# Patient Record
Sex: FEMALE | Race: WHITE | NOT HISPANIC OR LATINO | Employment: FULL TIME | URBAN - METROPOLITAN AREA
[De-identification: names, ages, dates, MRNs, and addresses within clinical notes are randomized per-mention and may not be internally consistent; named-entity substitution may affect disease eponyms.]

---

## 2017-02-13 ENCOUNTER — OFFICE VISIT (OUTPATIENT)
Dept: MEDICAL GROUP | Facility: MEDICAL CENTER | Age: 42
End: 2017-02-13
Payer: COMMERCIAL

## 2017-02-13 VITALS
DIASTOLIC BLOOD PRESSURE: 80 MMHG | TEMPERATURE: 89.4 F | BODY MASS INDEX: 27.3 KG/M2 | OXYGEN SATURATION: 98 % | HEART RATE: 66 BPM | RESPIRATION RATE: 14 BRPM | SYSTOLIC BLOOD PRESSURE: 122 MMHG | WEIGHT: 190.7 LBS | HEIGHT: 70 IN

## 2017-02-13 DIAGNOSIS — J06.9 UPPER RESPIRATORY TRACT INFECTION, UNSPECIFIED TYPE: ICD-10-CM

## 2017-02-13 DIAGNOSIS — G93.31 POSTVIRAL SYNDROME: ICD-10-CM

## 2017-02-13 PROCEDURE — 99214 OFFICE O/P EST MOD 30 MIN: CPT | Performed by: PHYSICIAN ASSISTANT

## 2017-02-13 RX ORDER — ALBUTEROL SULFATE 90 UG/1
2 AEROSOL, METERED RESPIRATORY (INHALATION) EVERY 6 HOURS PRN
Qty: 8.5 G | Refills: 0 | Status: SHIPPED | OUTPATIENT
Start: 2017-02-13 | End: 2017-08-10 | Stop reason: SDUPTHER

## 2017-02-13 ASSESSMENT — PATIENT HEALTH QUESTIONNAIRE - PHQ9: CLINICAL INTERPRETATION OF PHQ2 SCORE: 0

## 2017-02-13 ASSESSMENT — PAIN SCALES - GENERAL: PAINLEVEL: NO PAIN

## 2017-02-13 NOTE — MR AVS SNAPSHOT
"        Janette CUEVA Beny   2017 2:40 PM   Office Visit   MRN: [id number]    Department:  [place] [address]   Dept Phone:  [phone]    Description:  Female : 1975   Provider:  Hannah Black PA-C           Reason for Visit     URI poss URI      Allergies as of 2017     Allergen Noted Reactions    Codeine 2012   Vomiting      You were diagnosed with     Upper respiratory tract infection, unspecified type   [4849420]       Postviral syndrome   [527049]         Vital Signs     Blood Pressure Pulse Temperature Respirations Height Weight    122/80 mmHg 66 31.9 °C (89.4 °F) 14 1.778 m (5' 10\") 86.5 kg (190 lb 11.2 oz)    Body Mass Index Oxygen Saturation Last Menstrual Period Breastfeeding? Smoking Status       27.36 kg/m2 98% 2017 No Never Smoker        Basic Information     Date Of Birth Sex Race Ethnicity Preferred Language    1975 Female White Non- English      Your appointments     2017  8:00 AM   ANNUAL EXAM PREVENTATIVE with Hannah Black PA-C   [facility]    [address]  [address]  [place] [address]   [phone]              Problem List              ICD-10-CM Priority Class Noted - Resolved    Irritable bowel syndrome (IBS) K58.9   2014 - Present    Seasonal allergies J30.2   2014 - Present    Preventative health care Z00.00   2015 - Present    Vitamin D deficiency disease E55.9   2015 - Present    Elevated LDL cholesterol level E78.00   2015 - Present    Blood pressure elevated I10   2015 - Present      Health Maintenance        Date Due Completion Dates    PAP SMEAR 1994 ---    MAMMOGRAM 2015    IMM INFLUENZA (1) 2016 10/1/2010    IMM DTaP/Tdap/Td Vaccine (2 - Td) 2024            Current Immunizations     Influenza TIV (IM) 10/1/2010    Tdap Vaccine 2014    Tetanus Vaccine 2001      Below and/or attached are the medications " your provider expects you to take. Review all of your home medications and newly ordered medications with your provider and/or pharmacist. Follow medication instructions as directed by your provider and/or pharmacist. Please keep your medication list with you and share with your provider. Update the information when medications are discontinued, doses are changed, or new medications (including over-the-counter products) are added; and carry medication information at all times in the event of emergency situations     Allergies:  CODEINE - Vomiting               Medications  Valid as of: February 13, 2017 -  3:06 PM    Generic Name Brand Name Tablet Size Instructions for use    Albuterol Sulfate (Aero Soln) albuterol 108 (90 BASE) MCG/ACT Inhale 2 Puffs by mouth every 6 hours as needed for Shortness of Breath.        Cholecalciferol (Tab) cholecalciferol 1000 UNIT Take 1,000 Units by mouth every day.        Fluticasone Propionate (Suspension) FLONASE 50 MCG/ACT Spray 1 Spray in nose every day.        Multiple Vitamins-Minerals   Take  by mouth every day.        Omeprazole (CAPSULE DELAYED RELEASE) PRILOSEC 20 MG Take 1 Cap by mouth every day.        Sucralfate (Tab) CARAFATE 1 GM Take 1 Tab by mouth 4 Times a Day,Before Meals and at Bedtime.        .                 Medicines prescribed today were sent to:     [facility] [place] [address]    [address] [facility] [address]    Phone: [phone] Fax: [phone]    Open 24 Hours?: No      Medication refill instructions:       If your prescription bottle indicates you have medication refills left, it is not necessary to call your provider’s office. Please contact your pharmacy and they will refill your medication.    If your prescription bottle indicates you do not have any refills left, you may request refills at any time through one of the following ways: The online [employer]od[employer] system (except Urgent Care), by calling your provider’s  office, or by asking your pharmacy to contact your provider’s office with a refill request. Medication refills are processed only during regular business hours and may not be available until the next business day. Your provider may request additional information or to have a follow-up visit with you prior to refilling your medication.   *Please Note: Medication refills are assigned a new Rx number when refilled electronically. Your pharmacy may indicate that no refills were authorized even though a new prescription for the same medication is available at the pharmacy. Please request the medicine by name with the pharmacy before contacting your provider for a refill.           [email or web address] Access Code: Activation code not generated  Current Junk4Junk Status: Active

## 2017-02-13 NOTE — Clinical Note
[facility]  Hannah Black PA-C  [address]  [place] [address]  Fax: [phone] Authorization for Release/Disclosure of Protected Health Information   Name: JANETTE SWAN : 1975 SSN: [id number]   Address: [address] [place] [address] [place] [address] Phone:    [phone] (home)    I authorize the entity listed below to release/disclose the PHI below to [facility]/Hannah Black PA-C   Provider or Entity Name: Hugh Celis M.D. [facility]      Address   City, [facility]   Phone:      Fax:     Reason for request: continuity of care   Information to be released:    [  ] LAST COLONOSCOPY, including any PATH REPORT [  ] LAST DEXA  [  ] LAST MAMMOGRAM  [xxx  ] LAST PAP [  ] RETINA EXAM REPORT  [  ] IMMUNIZATION RECORDS  [  ] Release all info      [  ] Check here and initial the line next to each item to release ALL health information INCLUDING  _____ Care and treatment for drug and / or alcohol abuse  _____ HIV testing, infection status, or AIDS  _____ Genetic Testing    DATES OF SERVICE OR TIME PERIOD TO BE DISCLOSED: _____________  I understand and acknowledge that:  * This Authorization may be revoked at any time by you in writing, except if your health information has already been used or disclosed.  * Your health information that will be used or disclosed as a result of you signing this authorization could be re-disclosed by the recipient. If this occurs, your re-disclosed health information may no longer be protected by State or Federal laws.  * You may refuse to sign this Authorization. Your refusal will not affect your ability to obtain treatment.  * This Authorization becomes effective upon signing and will  on (date) __________. If no date is indicated, this Authorization will  one (1) year from the signature date.    Name: Janette Swan    Signature:     Date: 2017

## 2017-02-14 NOTE — PROGRESS NOTES
"Chief Complaint   Patient presents with   • URI     poss URI       HPI:   Had multiple URI sx 4 weeks ago and lasted about 2 weeks.   Sx were nasal congestion, ear popping, feeling of postnasal drip, sore throat, cough, fatigue.   Most of those symptoms resolved after 2 weeks, now just has cough, mild nasal congestion, feeling of postnasal drip and shortness of breath when skiing.   Denies fever, chills, headache, ear pain or pressure, facial pain or pressure, wheezing, chest pain, abdominal discomfort, nausea, vomiting, diarrhea, muscle ache, rash  Similarly ill exposures: yes  several coworkers  Medical history positive for bronchitis and pneumonia in past.   Has used albuterol in the past which has helped with this type of feeling after her colds.  Denies hx of asthma, copd  She  reports that she has never smoked. She has never used smokeless tobacco.    ROS  No fever. + cough. No skin rashes.  No N/V/D    Blood pressure 122/80, pulse 66, temperature 31.9 °C (89.4 °F), resp. rate 14, height 1.778 m (5' 10\"), weight 86.5 kg (190 lb 11.2 oz), last menstrual period 01/01/2017, SpO2 98 %, not currently breastfeeding.  Gen: alert, No conversational dyspnea. No audible wheeze, nontoxic.  PERRL, conjunctiva non-injected. No photophobia. No eye discharge.  Ears: normal pinnae. TM: normal  Nares patent with thick mucus. Nasal turbinates edematous  Sinuses non tender over maxillary / non tender frontal sinuses  Throat: erythematous injection. No exudate. Moderate thick white post nasal mucus  Neck: supple, with  no adenopathy in the neck or supraclavicular regions  Lungs:  clear to auscultation, no wheezing, no retraction, no stridor, good air exchange. No forced expiratory wheezing  Abdomen soft. No HSM.   Skin: warm and dry. No rash.    A/P    1. Upper respiratory tract infection, unspecified type  Most likely resolving and now just has the post viral tussis.   May benefit from albuterol 2 puffs prior to skiing.   Also " recommend treating nasal congestion and PND.   Nasal saline spray daily, Flonase daily, Mucinex with lots of water and some rest  Gargling with salt water, drinking warm teas. No chest x-ray needed at this time since lungs were clear to auscultation on exam. Told patient to follow-up in 2 weeks if not better  - albuterol 108 (90 BASE) MCG/ACT Aero Soln inhalation aerosol; Inhale 2 Puffs by mouth every 6 hours as needed for Shortness of Breath.  Dispense: 8.5 g; Refill: 0    2. Postviral syndrome  See #1  - albuterol 108 (90 BASE) MCG/ACT Aero Soln inhalation aerosol; Inhale 2 Puffs by mouth every 6 hours as needed for Shortness of Breath.  Dispense: 8.5 g; Refill: 0      Treatments advised today in addition to orders above  include: Nasal decongestant, sinus rinse or nasal saline, OTC cough/cold product of patient's choice PRN, OTC nonsteroidals PRN, prescription for symptomatic treatment as written, vaporizer, fluids and rest and heat application to sinuses    Please note that this dictation was created using voice recognition software. I have made every reasonable attempt to correct obvious errors, but I expect that there are errors of grammar and possibly content that I did not discover before finalizing the note.

## 2017-05-09 ENCOUNTER — HOSPITAL ENCOUNTER (OUTPATIENT)
Dept: RADIOLOGY | Facility: MEDICAL CENTER | Age: 42
End: 2017-05-09
Attending: OBSTETRICS & GYNECOLOGY
Payer: COMMERCIAL

## 2017-05-09 DIAGNOSIS — Z13.9 SCREENING: ICD-10-CM

## 2017-05-09 PROCEDURE — G0202 SCR MAMMO BI INCL CAD: HCPCS

## 2017-05-16 ENCOUNTER — HOSPITAL ENCOUNTER (OUTPATIENT)
Dept: RADIOLOGY | Facility: MEDICAL CENTER | Age: 42
End: 2017-05-16
Attending: OBSTETRICS & GYNECOLOGY
Payer: COMMERCIAL

## 2017-05-16 DIAGNOSIS — R92.8 ABNORMAL MAMMOGRAM: ICD-10-CM

## 2017-05-16 PROCEDURE — G0206 DX MAMMO INCL CAD UNI: HCPCS | Mod: RT

## 2017-05-16 PROCEDURE — 76642 ULTRASOUND BREAST LIMITED: CPT | Mod: RT

## 2017-06-05 ENCOUNTER — OFFICE VISIT (OUTPATIENT)
Dept: MEDICAL GROUP | Facility: MEDICAL CENTER | Age: 42
End: 2017-06-05
Payer: COMMERCIAL

## 2017-06-05 VITALS
SYSTOLIC BLOOD PRESSURE: 116 MMHG | WEIGHT: 187.61 LBS | HEIGHT: 70 IN | OXYGEN SATURATION: 96 % | TEMPERATURE: 98.4 F | RESPIRATION RATE: 14 BRPM | HEART RATE: 77 BPM | DIASTOLIC BLOOD PRESSURE: 64 MMHG | BODY MASS INDEX: 26.86 KG/M2

## 2017-06-05 DIAGNOSIS — R06.83 SNORING: ICD-10-CM

## 2017-06-05 DIAGNOSIS — E55.9 VITAMIN D DEFICIENCY DISEASE: ICD-10-CM

## 2017-06-05 DIAGNOSIS — E78.00 ELEVATED LDL CHOLESTEROL LEVEL: ICD-10-CM

## 2017-06-05 DIAGNOSIS — Z00.00 PREVENTATIVE HEALTH CARE: Primary | ICD-10-CM

## 2017-06-05 DIAGNOSIS — K58.9 IRRITABLE BOWEL SYNDROME, UNSPECIFIED TYPE: ICD-10-CM

## 2017-06-05 DIAGNOSIS — Z13.1 SCREENING FOR DIABETES MELLITUS: ICD-10-CM

## 2017-06-05 PROCEDURE — 99396 PREV VISIT EST AGE 40-64: CPT | Performed by: PHYSICIAN ASSISTANT

## 2017-06-05 ASSESSMENT — PAIN SCALES - GENERAL: PAINLEVEL: NO PAIN

## 2017-06-05 NOTE — Clinical Note
[facility]  Hannah Black PA-C  [address]  [place] [address]  Fax: [phone]   Authorization for Release/Disclosure of   Protected Health Information   Name: JANETTE SWAN : 1975 SSN: [id number]   Address: [address] [place] [address] [place] [address] Phone:    [phone] (home)    I authorize the entity listed below to release/disclose the PHI below to:   [facility]/Hannah Black PA-C and Hannah Black PA-C   Provider or Entity Name:  Dr. Celis   Address   City, [facility]   Phone:      Fax:     Reason for request: continuity of care   Information to be released:    [  ] LAST COLONOSCOPY,  including any PATH REPORT and follow-up  [  ] LAST FIT/COLOGUARD RESULT [  ] LAST DEXA  [  ] LAST MAMMOGRAM  [ xxxx ] LAST PAP  [  ] LAST LABS [  ] RETINA EXAM REPORT  [  ] IMMUNIZATION RECORDS  [  ] Release all info      [  ] Check here and initial the line next to each item to release ALL health information INCLUDING  _____ Care and treatment for drug and / or alcohol abuse  _____ HIV testing, infection status, or AIDS  _____ Genetic Testing    DATES OF SERVICE OR TIME PERIOD TO BE DISCLOSED: _____________  I understand and acknowledge that:  * This Authorization may be revoked at any time by you in writing, except if your health information has already been used or disclosed.  * Your health information that will be used or disclosed as a result of you signing this authorization could be re-disclosed by the recipient. If this occurs, your re-disclosed health information may no longer be protected by State or Federal laws.  * You may refuse to sign this Authorization. Your refusal will not affect your ability to obtain treatment.  * This Authorization becomes effective upon signing and will  on (date) __________.      If no date is indicated, this Authorization will  one (1) year from the signature date.    Name: Janette Swan    Signature:   Date:     2017       PLEASE FAX  REQUESTED RECORDS BACK TO: [phone]

## 2017-06-05 NOTE — ASSESSMENT & PLAN NOTE
Lipids- 5/9/14 tc 193/ tg 88/ hdl 54/ ldl 121  She is very active especially outdoors, does a lot of hiking, motorcross, skiing during the yadav (one day did 30 mi)  Has been eating very healthy, breakfast lunch and dinner with snacks in between, loves vegetables and has been eating a lot of fish.   Denies chest pain, shortness of breath, lightheadedness, muscle cramping

## 2017-06-05 NOTE — ASSESSMENT & PLAN NOTE
Both parents had sleep apnea.   She snores, occasionally wake with headaches.   Sometimes fatigue but not usually.   Denies startling awake.   Has hx of tonsilectomy

## 2017-06-05 NOTE — PROGRESS NOTES
"Subjective:     Chief Complaint   Patient presents with   • Annual Exam     Janette Swan is a 41 y.o. female here today for annual visit as listed below    Mammo- 5/16/17  PAP- 9/2016, with Dr. Celis  TDap- 4/25/14  Lipids- 5/9/14 tc 193/ tg 88/ hdl 54/ ldl 121    Irritable bowel syndrome (IBS)  No problems since 2005, controlled with imodium  Colonoscopy 2005, normal per pt    Snoring  Both parents had sleep apnea.   She snores, occasionally wake with headaches.   Sometimes fatigue but not usually.   Denies startling awake.   Has hx of tonsilectomy     Elevated LDL cholesterol level  Lipids- 5/9/14 tc 193/ tg 88/ hdl 54/ ldl 121  She is very active especially outdoors, does a lot of hiking, motorcross, skiing during the yadav (one day did 30 mi)  Has been eating very healthy, breakfast lunch and dinner with snacks in between, loves vegetables and has been eating a lot of fish.   Denies chest pain, shortness of breath, lightheadedness, muscle cramping       Current medicines (including changes today)  Current Outpatient Prescriptions   Medication Sig Dispense Refill   • albuterol 108 (90 BASE) MCG/ACT Aero Soln inhalation aerosol Inhale 2 Puffs by mouth every 6 hours as needed for Shortness of Breath. 8.5 g 0     No current facility-administered medications for this visit.     She  has a past medical history of IBS (irritable bowel syndrome); History of pneumonia (12/11); History of intestinal parasite (1994); Advance directive in chart (08/15/02); and Nephrolithiasis (4/24/2014).    ROS   No chest pain, no shortness of breath, no abdominal pain       Objective:     Blood pressure 116/64, pulse 77, temperature 36.9 °C (98.4 °F), resp. rate 14, height 1.778 m (5' 10\"), weight 85.1 kg (187 lb 9.8 oz), last menstrual period 05/14/2017, SpO2 96 %, not currently breastfeeding. Body mass index is 26.92 kg/(m^2).   Physical Exam:  Alert, oriented in no acute distress.  Eye contact is good, speech goal directed, " affect calm  HEENT: conjunctiva non-injected, sclera non-icteric, PERRL.  Pinna normal. TM pearly gray.   Oral mucous membranes pink and moist with no lesions.  Neck No adenopathy or masses in the neck or supraclavicular regions.  Lungs: clear to auscultation bilaterally with good excursion.  CV: regular rate and rhythm.  Abdomen: soft, nontender, no HSM, No CVAT  Ext: no edema, color normal, peripheral pulses 2+, temperature normal    Assessment and Plan:   The following treatment plan was discussed     1. Preventative health care  Mammo- 5/16/17  PAP- 9/2016, with Dr. Celis requesting record.   TDap- 4/25/14  Lipids- 5/9/14 tc 193/ tg 88/ hdl 54/ ldl 121 ordered labs today.   - COMP METABOLIC PANEL; Future  - LIPID PROFILE; Future  - VITAMIN D,25 HYDROXY; Future    2. Elevated LDL cholesterol level  Labs ordered, will call for results  Continue with the healthy lifestyle changes.   - COMP METABOLIC PANEL; Future  - LIPID PROFILE; Future    3. Vitamin D deficiency disease  Labs ordered, will call for results  cotninue with taking daily vit D3.   - VITAMIN D,25 HYDROXY; Future    4. Irritable bowel syndrome, unspecified type  controlled since have been controlling anxiety and not currently working     5. Screening for diabetes mellitus  Labs ordered, will call for results  - COMP METABOLIC PANEL; Future    6. Snoring  Has been snoring for a while and since both parents have sleep apnea she would just like to rule this out.   O2 stat is normal on RA. Does get occasional morning headaches.   Ordered overnight pulse ox. Going through [facility]    Followup: Return in about 1 year (around 6/5/2018) for annual.           Please note that this dictation was created using voice recognition software. I have made every reasonable attempt to correct obvious errors, but I expect that there are errors of grammar and possibly content that I did not discover before finalizing the note.

## 2017-06-14 ENCOUNTER — HOSPITAL ENCOUNTER (OUTPATIENT)
Dept: LAB | Facility: MEDICAL CENTER | Age: 42
End: 2017-06-14
Attending: PHYSICIAN ASSISTANT
Payer: COMMERCIAL

## 2017-06-14 DIAGNOSIS — E55.9 VITAMIN D DEFICIENCY DISEASE: ICD-10-CM

## 2017-06-14 DIAGNOSIS — E78.00 ELEVATED LDL CHOLESTEROL LEVEL: ICD-10-CM

## 2017-06-14 DIAGNOSIS — Z00.00 PREVENTATIVE HEALTH CARE: ICD-10-CM

## 2017-06-14 DIAGNOSIS — Z13.1 SCREENING FOR DIABETES MELLITUS: ICD-10-CM

## 2017-06-14 LAB
25(OH)D3 SERPL-MCNC: 27 NG/ML (ref 30–100)
ALBUMIN SERPL BCP-MCNC: 4.3 G/DL (ref 3.2–4.9)
ALBUMIN/GLOB SERPL: 1.4 G/DL
ALP SERPL-CCNC: 44 U/L (ref 30–99)
ALT SERPL-CCNC: 12 U/L (ref 2–50)
ANION GAP SERPL CALC-SCNC: 7 MMOL/L (ref 0–11.9)
AST SERPL-CCNC: 14 U/L (ref 12–45)
BILIRUB SERPL-MCNC: 0.2 MG/DL (ref 0.1–1.5)
BUN SERPL-MCNC: 7 MG/DL (ref 8–22)
CALCIUM SERPL-MCNC: 9.7 MG/DL (ref 8.5–10.5)
CHLORIDE SERPL-SCNC: 106 MMOL/L (ref 96–112)
CHOLEST SERPL-MCNC: 195 MG/DL (ref 100–199)
CO2 SERPL-SCNC: 26 MMOL/L (ref 20–33)
CREAT SERPL-MCNC: 0.75 MG/DL (ref 0.5–1.4)
GFR SERPL CREATININE-BSD FRML MDRD: >60 ML/MIN/1.73 M 2
GLOBULIN SER CALC-MCNC: 3 G/DL (ref 1.9–3.5)
GLUCOSE SERPL-MCNC: 91 MG/DL (ref 65–99)
HDLC SERPL-MCNC: 48 MG/DL
LDLC SERPL CALC-MCNC: 119 MG/DL
POTASSIUM SERPL-SCNC: 3.9 MMOL/L (ref 3.6–5.5)
PROT SERPL-MCNC: 7.3 G/DL (ref 6–8.2)
SODIUM SERPL-SCNC: 139 MMOL/L (ref 135–145)
TRIGL SERPL-MCNC: 138 MG/DL (ref 0–149)

## 2017-06-14 PROCEDURE — 80053 COMPREHEN METABOLIC PANEL: CPT

## 2017-06-14 PROCEDURE — 80061 LIPID PANEL: CPT

## 2017-06-14 PROCEDURE — 82306 VITAMIN D 25 HYDROXY: CPT

## 2017-06-14 PROCEDURE — 36415 COLL VENOUS BLD VENIPUNCTURE: CPT

## 2017-06-15 ENCOUNTER — TELEPHONE (OUTPATIENT)
Dept: MEDICAL GROUP | Facility: MEDICAL CENTER | Age: 42
End: 2017-06-15

## 2017-06-15 NOTE — TELEPHONE ENCOUNTER
----- Message from Hannah Black PA-C sent at 6/15/2017 10:04 AM PDT -----  Please inform patient that her vitamin D is still slightly low as well as increased slightly from 3 years ago. However recommend increasing her vitamin D3 but 1,000iu daily.   Also her cholesterol stayed about the same. Continue with healthy lifestyle, exercising and working shelby healthy diet. We will continue to monitor although not medications needed at this time.   Thank you  Hannah

## 2017-06-15 NOTE — TELEPHONE ENCOUNTER
Phone Number Called: [phone]     Message: Left message for patient about lab results.     Left Message for patient to call back: N\A

## 2017-06-20 NOTE — TELEPHONE ENCOUNTER
Phone Number Called: [phone] (home)     Message: Patient informed of result.     Left Message for patient to call back: yes

## 2017-07-12 ENCOUNTER — PATIENT MESSAGE (OUTPATIENT)
Dept: MEDICAL GROUP | Facility: MEDICAL CENTER | Age: 42
End: 2017-07-12

## 2017-07-12 DIAGNOSIS — R06.83 SNORING: ICD-10-CM

## 2017-07-12 DIAGNOSIS — R94.2 ABNORMAL RESULTS OF PULMONARY FUNCTION STUDIES: ICD-10-CM

## 2017-07-12 NOTE — TELEPHONE ENCOUNTER
I'm not sure if someone can look into this for me?   I only see in media the order for overnight pulse ox but no results.   Can someone please track down the results and contact pt that we got them?   Thank you  Hannah Black PA-C at 6/26/2017 12:43 PM      Status: Signed         Expand All Collapse All    Pt had overnight pulse ox done from [facility].    I do not see results yet.    Can you please look into this for me?   Thank you  Hannah

## 2017-07-12 NOTE — TELEPHONE ENCOUNTER
From: Janette Swan  To: Hannah Blakc PA-C  Sent: 7/12/2017 8:55 AM PDT  Subject: Test Result Question    Hi Hannah Cantrell,    I still haven't received my pulse ox test results. Please look into it for me.    Thanks,  Janette

## 2017-08-01 ENCOUNTER — HOSPITAL ENCOUNTER (OUTPATIENT)
Dept: CARDIOLOGY | Facility: MEDICAL CENTER | Age: 42
End: 2017-08-01
Attending: PHYSICIAN ASSISTANT
Payer: COMMERCIAL

## 2017-08-01 DIAGNOSIS — R94.2 ABNORMAL RESULTS OF PULMONARY FUNCTION STUDIES: ICD-10-CM

## 2017-08-01 LAB
LV EJECT FRACT  99904: 60
LV EJECT FRACT MOD 2C 99903: 63.88
LV EJECT FRACT MOD 4C 99902: 62.94
LV EJECT FRACT MOD BP 99901: 61.99

## 2017-08-01 PROCEDURE — 93306 TTE W/DOPPLER COMPLETE: CPT

## 2017-08-03 ENCOUNTER — TELEPHONE (OUTPATIENT)
Dept: MEDICAL GROUP | Facility: MEDICAL CENTER | Age: 42
End: 2017-08-03

## 2017-08-03 NOTE — TELEPHONE ENCOUNTER
Left message for patient to call back at [phone].  Please inform pt that she does not qualify for o2 set up because her RA sat is 96%

## 2017-08-03 NOTE — TELEPHONE ENCOUNTER
Spoke with pt and notified her of message. She states OPO results qualify her for o2 at night. Per Emily, we will contact [facility] to see if she can qualify with OPO results.     Call back number: [phone], ok to leave a detailed message

## 2017-08-08 ENCOUNTER — TELEPHONE (OUTPATIENT)
Dept: MEDICAL GROUP | Facility: MEDICAL CENTER | Age: 42
End: 2017-08-08

## 2017-08-08 NOTE — TELEPHONE ENCOUNTER
i informed the pt that they did not receive the first order we sent over. And that i have resubmitted this. i will reconnect with [facility] to see why this is taking so long

## 2017-08-08 NOTE — TELEPHONE ENCOUNTER
----- Message from CARLYN Forman sent at 8/8/2017  6:57 AM PDT -----  Regarding: FW: RE: RE: Prescription Question  Contact: [phone]  What is going on with this??   ----- Message -----     From: Janette Swan     Sent: 8/7/2017  11:43 PM       To: CARLYN Forman  Subject: RE: RE: RE: Prescription Question                Emily needs help, she is confused and I have not received progress on this order.  Please review, thanks.     ----- Message -----  From: CARLYN Forman  Sent: 8/1/17 8:40 PM  To: Janette Swan  Subject: RE: RE: Prescription Question    I signed the order and it should have been sent over on 7/24/17.  I will ask Emily to check on it in am.  Very sorry for the delay.  Patricia    ----- Message -----     From: JANETTE SWAN     Sent: 7/31/2017  9:28 AM PDT       To: CARLYN Forman  Subject: RE: RE: Prescription Question    Nico Gomez,    Nobody has contacted me about this yet.  How long does it usually take?    Janette Cifuentes    ----- Message -----  From: CARLYN Forman  Sent: 7/24/17 2:57 PM  To: Janette Swan  Subject: RE: Prescription Question    Will get you set up.  I will do the order today.    Patricia    ----- Message -----     From: JANETTE SWAN     Sent: 7/24/2017  1:58 PM PDT       To: CARLYN Forman  Subject: Prescription Question    Nico Gomez,    Based on my pulse ox test I would like to go on oxygen until my sleep study in December.  Please prescribe it for me.      Janette Cifuentes

## 2017-08-08 NOTE — TELEPHONE ENCOUNTER
Pt informed-also sent pt mychart message stating it most likely wont be covered since she did not test abn

## 2017-08-08 NOTE — TELEPHONE ENCOUNTER
Called [facility]. We can have pt do apnea test to see how those results are. Or we can order o2 for pt and she can pay out of pocket-[employer]people message sent to pt- multiple notes on this

## 2017-08-10 ENCOUNTER — OFFICE VISIT (OUTPATIENT)
Dept: MEDICAL GROUP | Facility: MEDICAL CENTER | Age: 42
End: 2017-08-10
Payer: COMMERCIAL

## 2017-08-10 VITALS
SYSTOLIC BLOOD PRESSURE: 128 MMHG | TEMPERATURE: 98.1 F | OXYGEN SATURATION: 96 % | DIASTOLIC BLOOD PRESSURE: 78 MMHG | HEART RATE: 70 BPM | WEIGHT: 190 LBS | HEIGHT: 70 IN | BODY MASS INDEX: 27.2 KG/M2

## 2017-08-10 DIAGNOSIS — E78.00 ELEVATED LDL CHOLESTEROL LEVEL: ICD-10-CM

## 2017-08-10 DIAGNOSIS — R09.02 HYPOXIA: ICD-10-CM

## 2017-08-10 DIAGNOSIS — G47.30 SLEEP APNEA IN ADULT: ICD-10-CM

## 2017-08-10 DIAGNOSIS — E55.9 VITAMIN D DEFICIENCY: ICD-10-CM

## 2017-08-10 PROCEDURE — 99214 OFFICE O/P EST MOD 30 MIN: CPT | Performed by: NURSE PRACTITIONER

## 2017-08-10 RX ORDER — ALBUTEROL SULFATE 90 UG/1
2 AEROSOL, METERED RESPIRATORY (INHALATION) EVERY 6 HOURS PRN
Qty: 8.5 G | Refills: 1 | Status: SHIPPED | OUTPATIENT
Start: 2017-08-10 | End: 2018-12-13

## 2017-08-10 NOTE — MR AVS SNAPSHOT
"        Janetteazalia Swan   8/10/2017 8:15 AM   Office Visit   MRN: [id number]    Department:  [facility]   Dept Phone:  [phone]    Description:  Female : 1975   Provider:  CARLYN Forman           Reason for Visit     Follow-Up discuss ECHO results       Allergies as of 8/10/2017     Allergen Noted Reactions    Codeine 2012   Vomiting      You were diagnosed with     Hypoxia   [869409]       Sleep apnea in adult   [8082584]   f/u with sleep clinic as sched in oct.  o2 2 l/min at nite.  do PFT's.  f/u with pt with results. refill inhaler    Elevated LDL cholesterol level   [401833]   improve low fat/chol/complex carb diet.  already exercises regularly    Vitamin D deficiency   [4707314]   inc d3 to 2000 units daily      Vital Signs     Blood Pressure Pulse Temperature Height Weight Body Mass Index    128/78 mmHg 70 36.7 °C (98.1 °F) 1.778 m (5' 10\") 86.183 kg (190 lb) 27.26 kg/m2    Oxygen Saturation Smoking Status                96% Never Smoker           Basic Information     Date Of Birth Sex Race Ethnicity Preferred Language    1975 Female White Non- English      Your appointments     Oct 10, 2017  2:40 PM   New Patient with C Rotation   [facility] (--)    [address] [place] [address]  Sai CALZADA [id number] [phone]           Please bring enclosed paperwork completed along with your insurance card and photo ID.              Problem List              ICD-10-CM Priority Class Noted - Resolved    Irritable bowel syndrome (IBS) K58.9   2014 - Present    Preventative health care Z00.00   2015 - Present    Vitamin D deficiency disease E55.9   2015 - Present    Elevated LDL cholesterol level E78.00   2015 - Present    Snoring R06.83   2017 - Present      Health Maintenance        Date Due Completion Dates    PAP SMEAR 1994 ---    IMM INFLUENZA (1) 2017 10/1/2010    MAMMOGRAM 2018, 2017, " 4/8/2014    IMM DTaP/Tdap/Td Vaccine (2 - Td) 4/25/2024 4/25/2014            Current Immunizations     Influenza TIV (IM) 10/1/2010    Tdap Vaccine 4/25/2014    Tetanus Vaccine 1/1/2001      Below and/or attached are the medications your provider expects you to take. Review all of your home medications and newly ordered medications with your provider and/or pharmacist. Follow medication instructions as directed by your provider and/or pharmacist. Please keep your medication list with you and share with your provider. Update the information when medications are discontinued, doses are changed, or new medications (including over-the-counter products) are added; and carry medication information at all times in the event of emergency situations     Allergies:  CODEINE - Vomiting               Medications  Valid as of: August 10, 2017 -  9:20 AM    Generic Name Brand Name Tablet Size Instructions for use    Albuterol Sulfate (Aero Soln) albuterol 108 (90 BASE) MCG/ACT Inhale 2 Puffs by mouth every 6 hours as needed for Shortness of Breath.        .                 Medicines prescribed today were sent to:     [place] [id number] [facility] [place] [address]    Phone: [phone] Fax: [phone]    Open 24 Hours?: No      Medication refill instructions:       If your prescription bottle indicates you have medication refills left, it is not necessary to call your provider’s office. Please contact your pharmacy and they will refill your medication.    If your prescription bottle indicates you do not have any refills left, you may request refills at any time through one of the following ways: The online [employer] system (except Urgent Care), by calling your provider’s office, or by asking your pharmacy to contact your provider’s office with a refill request. Medication refills are processed only during regular business hours and may not be available until the next business  day. Your provider may request additional information or to have a follow-up visit with you prior to refilling your medication.   *Please Note: Medication refills are assigned a new Rx number when refilled electronically. Your pharmacy may indicate that no refills were authorized even though a new prescription for the same medication is available at the pharmacy. Please request the medicine by name with the pharmacy before contacting your provider for a refill.           [employer] Access Code: Activation code not generated  Current [employer] Status: Active

## 2017-08-10 NOTE — PROGRESS NOTES
Subjective:      Janette Swan is a 42 y.o. female who presents with Follow-Up            HPI  Seen in f/u for snoring.  She needs refill on alb.  Uses only rarely with skiing.    Reviewed lab with pt. Her CMP, GFR is wnl  LP shows trg and LDL are abn and not at goal.  Tries to follow a healthy diet.  Exercise regulalry.  + FH of hyperlipidemia, DM and CAD.  HDL is at goal  Vitamin d is low at 27.  She is on otc supplement 1000 units daily.   Echo shows no signifcant abn.  Trace regurg on multiple valves only.    She has a heavy FH of sleep apnea.  She had a apnea link done that showed o2 sat <89%  For 11 min.  She does qualify for o2.  She has appt for sleep study in oct.  We had previously asked for o2 at pt request but o2 company denied.  We were unaware of the abn apnea link at the time of the order.    Reviewed all lab, echo and apnea link with pt.  She does qualify for o2.      Patient Active Problem List    Diagnosis Date Noted   • Snoring 06/05/2017   • Preventative health care 08/27/2015   • Vitamin D deficiency disease 08/27/2015   • Elevated LDL cholesterol level 08/27/2015   • Irritable bowel syndrome (IBS) 04/25/2014     Current Outpatient Prescriptions   Medication Sig Dispense Refill   • albuterol 108 (90 BASE) MCG/ACT Aero Soln inhalation aerosol Inhale 2 Puffs by mouth every 6 hours as needed for Shortness of Breath. 8.5 g 0     No current facility-administered medications for this visit.     Allergies   Allergen Reactions   • Codeine Vomiting       ROS    Review of Systems   Constitutional: Negative.  Negative for fever, chills, weight loss, malaise/fatigue and diaphoresis.   HENT: Negative.  Negative for hearing loss, ear pain, nosebleeds, congestion, sore throat, neck pain, tinnitus and ear discharge.    Respiratory: Negative.  Negative for cough, hemoptysis, sputum production, shortness of breath, wheezing and stridor.    Cardiovascular: Negative.  Negative for chest pain, palpitations,  "orthopnea, claudication, leg swelling and PND.   Gastrointestinal: denies nausea, vomiting, diarrhea, constipation, heartburn, melena or hematochezia.  Genitourinary: Denies dysuria, hematuria, urinary incontinence, frequency or urgency.         Objective:     /78 mmHg  Pulse 70  Temp(Src) 36.7 °C (98.1 °F)  Ht 1.778 m (5' 10\")  Wt 86.183 kg (190 lb)  BMI 27.26 kg/m2  SpO2 96%     Physical Exam      Physical Exam   Vitals reviewed.  Constitutional: oriented to person, place, and time. appears well-developed and well-nourished. No distress.   Cardiovascular: Normal rate, regular rhythm, normal heart sounds and intact distal pulses.  Exam reveals no gallop and no friction rub.  No murmur heard.  No carotid bruits.   Pulmonary/Chest: Effort normal and breath sounds normal. No stridor. No respiratory distress. no wheezes or rales. exhibits no tenderness.   Musculoskeletal: Normal range of motion. exhibits no edema. edmar pedal pulses 2+.  Neurological: alert and oriented to person, place, and time. exhibits normal muscle tone. Coordination normal.   Skin: Skin is warm and dry. no diaphoresis.   Psychiatric: normal mood and affect. behavior is normal.            Assessment/Plan:     1. Hypoxia  albuterol 108 (90 BASE) MCG/ACT Aero Soln inhalation aerosol    PULMONARY FUNCTION TESTS Test requested: Complete Pulmonary Function Test   2. Sleep apnea in adult  albuterol 108 (90 BASE) MCG/ACT Aero Soln inhalation aerosol    PULMONARY FUNCTION TESTS Test requested: Complete Pulmonary Function Test    f/u with sleep clinic as sched in oct.  o2 2 l/min at nite.  do PFT's.  f/u with pt with results. refill inhaler   3. Elevated LDL cholesterol level      improve low fat/chol/complex carb diet.  already exercises regularly   4. Vitamin D deficiency      inc d3 to 2000 units daily     5.  F/u with Hannah estrada  6.  Over 50% of this appt was spent in education and counseling for sleep apnea and hyperlipidemia.  "

## 2017-08-14 ENCOUNTER — HOSPITAL ENCOUNTER (OUTPATIENT)
Dept: LAB | Facility: MEDICAL CENTER | Age: 42
End: 2017-08-14
Attending: OBSTETRICS & GYNECOLOGY
Payer: COMMERCIAL

## 2017-08-14 PROCEDURE — 88175 CYTOPATH C/V AUTO FLUID REDO: CPT

## 2017-08-15 LAB — CYTOLOGY REG CYTOL: NORMAL

## 2017-08-25 ENCOUNTER — HOSPITAL ENCOUNTER (OUTPATIENT)
Dept: OTHER | Facility: MEDICAL CENTER | Age: 42
End: 2017-08-25
Attending: NURSE PRACTITIONER
Payer: COMMERCIAL

## 2017-08-25 DIAGNOSIS — R09.02 HYPOXIA: ICD-10-CM

## 2017-08-25 DIAGNOSIS — G47.30 SLEEP APNEA IN ADULT: ICD-10-CM

## 2017-08-25 PROCEDURE — 94060 EVALUATION OF WHEEZING: CPT

## 2017-08-25 PROCEDURE — 94726 PLETHYSMOGRAPHY LUNG VOLUMES: CPT

## 2017-08-25 PROCEDURE — 94729 DIFFUSING CAPACITY: CPT | Mod: 26 | Performed by: INTERNAL MEDICINE

## 2017-08-25 PROCEDURE — 94726 PLETHYSMOGRAPHY LUNG VOLUMES: CPT | Mod: 26 | Performed by: INTERNAL MEDICINE

## 2017-08-25 PROCEDURE — 94729 DIFFUSING CAPACITY: CPT

## 2017-08-25 PROCEDURE — 94060 EVALUATION OF WHEEZING: CPT | Mod: 26 | Performed by: INTERNAL MEDICINE

## 2017-08-25 ASSESSMENT — PULMONARY FUNCTION TESTS
FEV1/FVC_PERCENT_PREDICTED: 80
FEV1_PERCENT_PREDICTED: 115
FEV1_PERCENT_CHANGE: -3
FVC_PERCENT_PREDICTED: 116
FVC_PERCENT_PREDICTED: 113
FEV1/FVC_PERCENT_CHANGE: 0
FEV1: 4.08
FEV1/FVC_PERCENT_PREDICTED: 102
FEV1_PERCENT_CHANGE: 0
FEV1_PREDICTED: 3.53
FEV1/FVC_PERCENT_PREDICTED: 98
FVC: 4.99
FEV1/FVC: 79
FEV1: 4.05
FVC: 5.15
FVC_PREDICTED: 4.41
FEV1/FVC: 81.76
FEV1_PERCENT_PREDICTED: 114

## 2017-08-29 NOTE — PROCEDURES
DATE OF STUDY:  08/25/2017    Technician notes, the patient had good effort and cooperation.  The results of   the test meets the ATS standards for acceptability and repeatability.    SPIROMETRY:  1.  FVC was 5.15 liters, 116% of predicted.  2.  FEV1 was 4.05 liters, 114% of predicted.  3.  FEV1/FVC ratio was 79%.  4.  There was no significant response to bronchodilators.  5.  Flow volume loop was normal in shape and size.    LUNG VOLUMES:  1.  Residual volume was 1.94 liters, 98% of predicted.  2.  Total lung capacity was 7.05 liters, 118% of predicted.    Diffusion capacity was normal at 122% predicted.    IMPRESSION:  The patient had normal pulmonary function test.  Clinical   correlation is required.       ____________________________________     MD CHARLES Kolb / ANN    DD:  08/29/2017 11:34:47  DT:  08/29/2017 11:43:33    D#:  [id number]  Job#:  [id number]

## 2017-10-10 ENCOUNTER — SLEEP CENTER VISIT (OUTPATIENT)
Dept: SLEEP MEDICINE | Facility: MEDICAL CENTER | Age: 42
End: 2017-10-10
Payer: COMMERCIAL

## 2017-10-10 VITALS
BODY MASS INDEX: 26.92 KG/M2 | RESPIRATION RATE: 16 BRPM | SYSTOLIC BLOOD PRESSURE: 140 MMHG | WEIGHT: 188 LBS | TEMPERATURE: 98.8 F | HEART RATE: 68 BPM | OXYGEN SATURATION: 97 % | DIASTOLIC BLOOD PRESSURE: 88 MMHG | HEIGHT: 70 IN

## 2017-10-10 DIAGNOSIS — G47.34 NOCTURNAL HYPOXEMIA: ICD-10-CM

## 2017-10-10 DIAGNOSIS — R06.83 SNORING: ICD-10-CM

## 2017-10-10 PROCEDURE — 99204 OFFICE O/P NEW MOD 45 MIN: CPT | Performed by: INTERNAL MEDICINE

## 2017-10-10 RX ORDER — MELATONIN
1000 DAILY
COMMUNITY
End: 2022-03-09

## 2017-10-10 NOTE — PROGRESS NOTES
CC: Evaluation of sleep apnea    HPI:  42-year-old female  kindly referred by Isa OSBORN for eval of possible LUIS FELIPE.. Her symptoms include snoring, daytime fatigue, difficulty finding the words to express herself, and awakening with headaches, which has been worsening. Both of her parents had sleep apnea. Father was never diagnosed but had Alzheimers. Mom is on nocturnal O2 but not cpap - had a recent sleep study, unknown results.     The patient had an overnight oximetry in June which showed saturations less than or equal to 89% for 11 minutes and 8 seconds or 3% of the recording and saturations less than or equal to 88% for 2 minutes 56 seconds or 0.8% of the recording. The oximetric pattern was not clustered or significantly sawtoothed. Her total Roundup score is only 3.     Her comorbidities include an elevated LDL cholesterol, vitamin D deficiency, and irritable bowel syndrome.          Patient Active Problem List    Diagnosis Date Noted   • Snoring 06/05/2017   • Preventative health care 08/27/2015   • Vitamin D deficiency disease 08/27/2015   • Elevated LDL cholesterol level 08/27/2015   • Irritable bowel syndrome (IBS) 04/25/2014       Past Medical History:   Diagnosis Date   • Nephrolithiasis 4/24/2014   • History of pneumonia 12/11   • Advance directive in chart 08/15/02    NO BLOOD PRODUCTS   • History of intestinal parasite 1994    after [place] trip   • Frequent headaches    • IBS (irritable bowel syndrome)    • Morning headache         Past Surgical History:   Procedure Laterality Date   • TONSILLECTOMY  1995       Family History   Problem Relation Age of Onset   • Hypertension Mother    • Hyperlipidemia Mother    • Heart Disease Mother 77     MI   • Sleep Apnea Mother    • Hypertension Father    • Hyperlipidemia Father    • Other Father      Alzheimers   • Sleep Apnea Father        Social History     Social History   • Marital status:      Spouse name: N/A   • Number of  "children: N/A   • Years of education: N/A     Occupational History   • Not on file.     Social History Main Topics   • Smoking status: Never Smoker   • Smokeless tobacco: Never Used   • Alcohol use 1.2 oz/week     2 Standard drinks or equivalent per week      Comment: 4 drinks per week   • Drug use: No   • Sexual activity: Yes     Partners: Male      Comment: vasectomy     Other Topics Concern   • Not on file     Social History Narrative   • No narrative on file       Current Outpatient Prescriptions   Medication Sig Dispense Refill   • vitamin D3, cholecalciferol, 1000 UNIT Tab Take 1,000 Units by mouth every day.     • albuterol 108 (90 BASE) MCG/ACT Aero Soln inhalation aerosol Inhale 2 Puffs by mouth every 6 hours as needed for Shortness of Breath. 8.5 g 1     No current facility-administered medications for this visit.     \"CURRENT RX\"    ALLERGIES: Codeine    ROS  Per history of present illness otherwise negative.    PHYSICAL EXAM    /88   Pulse 68   Temp 37.1 °C (98.8 °F)   Resp 16   Ht 1.778 m (5' 10\")   Wt 85.3 kg (188 lb)   SpO2 97%   BMI 26.98 kg/m²   Appearance: Well-nourished, well-developed, no acute distress  Eyes:  PERRLA, EOMI  Hearing:  Grossly intact  Nose:  Normal, no lesions or deformities, turbinates moist  Oropharynx:  Tongue normal, posterior pharynx without erythema or exudate  Mallampati classification:    Neck: Supple, trachea midline, no masses  Respiratory effort:  No intercostal retractions or use of accessory muscles  Lung auscultation:  No wheezes rhonchi rubs or rales  Cardiac auscultation:  No murmurs, rubs, or gallops, no regular rhythm, normal rate  Abdomen:  No tenderness, no organomegaly  Extremities:  No cyanosis, clubbing, edema  Gait and Station:  Normal  Digits and nails: No clubbing, cyanosis, petechiae, or nodes  Musculoskeletal:  Grossly normal  Skin:  No rashes  Orientation:  Oriented time, place, and person  Mood and affect:  No depression, anxiety, " agitation  Judgment:  Intact    PROBLEMS:  1. Snoring  2. Nocturnal hypoxemia  - OVERNIGHT HOME SLEEP STUDY; Future      PLAN:   The patient has signs and symptoms consistent with obstructive sleep apnea hypopnea syndrome. We'll schedule her to have an overnight home sleep study to evaluate to greater extent. Her signs and symptoms are not so dramatic in my opinion to warrant an attended sleep study. She will return to clinic thereafter for a review. If she does have significant sleep apnea, she will be scheduled for a positive airway pressure titration.    Return for after sleep study.

## 2017-10-23 ENCOUNTER — HOME STUDY (OUTPATIENT)
Dept: SLEEP MEDICINE | Facility: MEDICAL CENTER | Age: 42
End: 2017-10-23
Attending: INTERNAL MEDICINE
Payer: COMMERCIAL

## 2017-10-23 DIAGNOSIS — G47.34 NOCTURNAL HYPOXEMIA: ICD-10-CM

## 2017-10-23 PROCEDURE — 95806 SLEEP STUDY UNATT&RESP EFFT: CPT | Performed by: FAMILY MEDICINE

## 2017-10-24 NOTE — PROCEDURES
DIAGNOSTIC HOME SLEEP TEST (HST) REPORT       PATIENT ID:  NAME:  Janette Swan  MRN:               [id number]  YOB: 1975  DATE OF STUDY: 10/23/17      Impression:     This study shows evidence of:     Mild obstructive sleep apnea with  Respiratory Event Index (SOFIYA) of 8.7 per hour. These findings are based on the recording time (flow evaluation time). It is not possible with this device to determine a traditional apnea+hypopnea index (AHI) for total sleep time since EEG channels are not available.   O2 Sat. pat was 88% and mean O2 sat was 93% and baseline O2 at 94 %. O2 sat was below 90% for 1% of the flow evaluation time. Oxygen Desaturation (>=3%) Index was elevated at 9.2/hr.       TECHNICAL DESCRIPTION:  [employer] Device used was a type-III home study device. Home sleep study recording included: Airflow recording by nasal pressure transducer; Respiratory Effort by abdominal Respiratory Bands; O2 by finger oximetry. A position sensor and a snore channel was also used.    Scoring Criteria: A modification of the the AASM Manual for the Scoring of Sleep and Associated Events, 2012, was used.   Obstructive apnea was scored by cessation of airflow for at least 10 seconds with continuing respiratory effort.  Central apnea was scored by cessation of airflow for at least 10 seconds with no effort.  Hypopnea was scored by a 30% or more reduction in airflow for at least 10 seconds accompanied by an arterial oxygen desaturation of 3% or more.  (For Medicare patients, hypopneas were scored by a 30% or more reduction in airflow for at least 10 seconds accompanied by an arterial oxygen saturation of 4% or more, as required by their insurance, CMS.      INDICATION: Her symptoms include snoring, daytime fatigue, difficulty finding the words to express herself, and awakening with headaches, which has been worsening.       General sleep summary: . Total recording time is 7 hours and 26 minutes and total  flow evaluation time is 7 hours and 14 minutes. The patient spent 4 hours and 0 minutes in the supine position and 3 hours and 14 minutes in the nonsupine position.    Respiratory events:    Apneas: 1 (Obstructive apnea index 0/hr, Central apnea index 0.1 /hr, mixed 0 /hour)  Hypopneas: 62    Recommendations:    1. CPAP titration study vs Auto CPAP.   2.   In general patients with sleep apnea are advised to avoid alcohol and sedatives and to not operate a motor vehicle while drowsy. In some cases alternative treatment options may prove effective in resolving sleep apnea in these options include upper airway surgery, the use of a dental orthotic or weight loss and positional therapy. Clinical correlation is required.           Donavon Field MD

## 2017-10-30 ENCOUNTER — OFFICE VISIT (OUTPATIENT)
Dept: PULMONOLOGY | Facility: HOSPICE | Age: 42
End: 2017-10-30
Payer: COMMERCIAL

## 2017-10-30 VITALS
DIASTOLIC BLOOD PRESSURE: 65 MMHG | OXYGEN SATURATION: 95 % | HEART RATE: 91 BPM | BODY MASS INDEX: 26.92 KG/M2 | SYSTOLIC BLOOD PRESSURE: 100 MMHG | HEIGHT: 70 IN | RESPIRATION RATE: 15 BRPM | WEIGHT: 188 LBS

## 2017-10-30 DIAGNOSIS — G47.33 OSA (OBSTRUCTIVE SLEEP APNEA): ICD-10-CM

## 2017-10-30 DIAGNOSIS — G47.10 HYPERSOMNOLENCE: ICD-10-CM

## 2017-10-30 DIAGNOSIS — R06.83 SNORING: ICD-10-CM

## 2017-10-30 PROCEDURE — 99213 OFFICE O/P EST LOW 20 MIN: CPT | Performed by: NURSE PRACTITIONER

## 2017-10-30 NOTE — PROGRESS NOTES
Chief Complaint   Patient presents with   • Results     HSS         HPI: This patient is a 42 y.o. female, who presents forHome sleep study result. Medical history includes IBS, vitamin D deficiency.    Recently referred for evaluation of possible LUIS FELIPE. Sleep symptoms includes snoring, daytime hypersomnolence, aphasia, awakening with headaches. Both of her parents have sleep apnea. Overnight oximetry in June showed nocturnal desaturations. She's been using nocturnal O2 since that time. Home sleep study confirms mild sleep apnea with an AHI of 8.7, minimum saturation 88%. Testing reviewed in detail with the patient. Treatment options including CPAP and dental appliance discussed. Patient has TMJ, would likely not tolerate a dental appliance. For this reason, CPAP is recommended. Patient's  has CPAP. She is familiar with this and is amenable to a trial.    She has mild exercise-induced asthma. Uses albuterol prior to skiing. Recent PFTs are normal. Denies respiratory complaints today.    Past Medical History:   Diagnosis Date   • Advance directive in chart 08/15/02    NO BLOOD PRODUCTS   • Frequent headaches    • History of intestinal parasite 1994    after [place] trip   • History of pneumonia 12/11   • IBS (irritable bowel syndrome)    • Morning headache    • Nephrolithiasis 4/24/2014       Social History   Substance Use Topics   • Smoking status: Never Smoker   • Smokeless tobacco: Never Used   • Alcohol use 1.2 oz/week     2 Standard drinks or equivalent per week      Comment: 4 drinks per week       Family History   Problem Relation Age of Onset   • Hypertension Mother    • Hyperlipidemia Mother    • Heart Disease Mother 77     MI   • Sleep Apnea Mother    • Hypertension Father    • Hyperlipidemia Father    • Other Father      Alzheimers   • Sleep Apnea Father        Current medications as of today   Current Outpatient Prescriptions   Medication Sig Dispense Refill   • vitamin D3, cholecalciferol, 1000 UNIT  "Tab Take 1,000 Units by mouth every day.     • albuterol 108 (90 BASE) MCG/ACT Aero Soln inhalation aerosol Inhale 2 Puffs by mouth every 6 hours as needed for Shortness of Breath. 8.5 g 1     No current facility-administered medications for this visit.        Allergies: Codeine    Blood pressure 100/65, pulse 91, resp. rate 15, height 1.778 m (5' 10\"), weight 85.3 kg (188 lb), SpO2 95 %.      ROS:   Constitutional: Denies fevers, chills, night sweats, weight loss. Positive hypersomnolence  Eyes: Denies pain, discharge/drainage  ENT: Denies tinnitus, hearing loss, sinusitis, hoarseness, epistaxis  Allergic: Denies Allergic rhinitis or hayfever  Respiratory: Denies cough, wheeze, dyspnea, hemoptysis  Cardiovascular: Denies chest pain, tightness, palpitations, orthopnea or edema  Sleep: See HPI  GI/: IBS well-managed  Musculoskeletal: Denies back pain, painful joints, sore muscles  Neurological: Denies vertigo positive a.m. headache  Skin: Denies rashes, lesions  Psychiatric: Denies depression or anxiety    Physical exam:   Constitutional: Well-nourished, well-developed, in no acute distress  Eyes: PERRL  Mouth/Throat: Oropharynx is moist, clear, no lesions  Neck: supple, no masses  Respiratory: no intercostal retractions or accessory muscle use   Lungs auscultation: Clear to auscultation bilaterally  Cardiovascular: Regular rate rhythm no murmurs, rubs or gallops  Musculoskeletal: no clubbing or cyanosis  Skin: No rashes or lesions  Neuro: No focal deficit, cranial nerves grossly intact  Psychiatric: Oriented to time, person and place.     Diagnosis:  1. Snoring     2. LUIS FELIPE (obstructive sleep apnea)  DME CPAP    DME [employer]   3. Hypersomnolence         Plan:  1. Initiate auto CPAP 5-15 cm H2O, order to preferred home care.  2. Overnight oximetry on CPAP prior to next visit  3. If oximetry is adequate DC nocturnal O2  4. Follow up in approximately 8-12 weeks with compliance download.    "

## 2017-11-27 ENCOUNTER — HOSPITAL ENCOUNTER (OUTPATIENT)
Dept: RADIOLOGY | Facility: MEDICAL CENTER | Age: 42
End: 2017-11-27
Attending: OBSTETRICS & GYNECOLOGY
Payer: COMMERCIAL

## 2017-11-27 DIAGNOSIS — N64.89 BREASTS ASYMMETRICAL: ICD-10-CM

## 2017-11-27 PROCEDURE — G0206 DX MAMMO INCL CAD UNI: HCPCS | Mod: RT

## 2017-11-27 PROCEDURE — 76642 ULTRASOUND BREAST LIMITED: CPT | Mod: RT

## 2018-02-02 ENCOUNTER — HOME STUDY (OUTPATIENT)
Dept: SLEEP MEDICINE | Facility: MEDICAL CENTER | Age: 43
End: 2018-02-02
Attending: NURSE PRACTITIONER
Payer: COMMERCIAL

## 2018-02-02 ENCOUNTER — SLEEP CENTER VISIT (OUTPATIENT)
Dept: SLEEP MEDICINE | Facility: MEDICAL CENTER | Age: 43
End: 2018-02-02
Payer: COMMERCIAL

## 2018-02-02 VITALS
OXYGEN SATURATION: 94 % | SYSTOLIC BLOOD PRESSURE: 125 MMHG | HEART RATE: 80 BPM | WEIGHT: 188 LBS | RESPIRATION RATE: 15 BRPM | DIASTOLIC BLOOD PRESSURE: 70 MMHG | HEIGHT: 70 IN | BODY MASS INDEX: 26.92 KG/M2

## 2018-02-02 DIAGNOSIS — G47.33 OSA (OBSTRUCTIVE SLEEP APNEA): ICD-10-CM

## 2018-02-02 PROBLEM — R06.83 SNORING: Status: RESOLVED | Noted: 2017-06-05 | Resolved: 2018-02-02

## 2018-02-02 PROCEDURE — 99213 OFFICE O/P EST LOW 20 MIN: CPT | Performed by: NURSE PRACTITIONER

## 2018-02-02 PROCEDURE — 94762 N-INVAS EAR/PLS OXIMTRY CONT: CPT | Performed by: FAMILY MEDICINE

## 2018-02-02 NOTE — PATIENT INSTRUCTIONS
1. Continue CPAP nightly, decrease CPAP pressure 5-10 cm H2O. Pressure change completed in the office  2. Overnight oximetry on CPAP, okay to call for result  3. Clean mask and tubing weekly  4. Follow-up in 4-6 months

## 2018-02-02 NOTE — PROGRESS NOTES
Chief Complaint   Patient presents with   • Follow-Up     COMPLIENCE DL          HPI: This patient is a 42 y.o. female, who presents for follow-up LUIS FELIPE with first compliance check.     Medical history includes IBS, vitamin D deficiency.     Home sleep study confirms mild sleep apnea with an AHI of 8.7, minimum saturation 88%. She was initiated on auto CPAP at her last visit in October. She is compliant with auto CPAP 5-15 cm H2O. Compliance report of the past 30 days shows 73% years, average use of 5 hours per night, AHI of 2. Patient reports aerophagia in the morning. She continues to acclimate to her machine. Her morning headaches have resolved. She is using a mask that is comfortable. Overall happy with therapy.     Past Medical History:   Diagnosis Date   • Advance directive in chart 08/15/02    NO BLOOD PRODUCTS   • Frequent headaches    • History of intestinal parasite 1994    after [place] trip   • History of pneumonia 12/11   • IBS (irritable bowel syndrome)    • Morning headache    • Nephrolithiasis 4/24/2014       Social History   Substance Use Topics   • Smoking status: Never Smoker   • Smokeless tobacco: Never Used   • Alcohol use 1.2 oz/week     2 Standard drinks or equivalent per week      Comment: 4 drinks per week       Family History   Problem Relation Age of Onset   • Hypertension Mother    • Hyperlipidemia Mother    • Heart Disease Mother 77     MI   • Sleep Apnea Mother    • Hypertension Father    • Hyperlipidemia Father    • Other Father      Alzheimers   • Sleep Apnea Father        Current medications as of today   Current Outpatient Prescriptions   Medication Sig Dispense Refill   • vitamin D3, cholecalciferol, 1000 UNIT Tab Take 1,000 Units by mouth every day.     • albuterol 108 (90 BASE) MCG/ACT Aero Soln inhalation aerosol Inhale 2 Puffs by mouth every 6 hours as needed for Shortness of Breath. 8.5 g 1     No current facility-administered medications for this visit.        Allergies:  "Codeine    Blood pressure 125/70, pulse 80, resp. rate 15, height 1.778 m (5' 10\"), weight 85.3 kg (188 lb), SpO2 94 %.      ROS:   Constitutional: Denies fevers, chills, night sweats, weight loss or fatigue  Eyes: Denies pain, discharge/drainage  ENT: Denies hearing loss, sinusitis, hoarseness.  Allergic: Denies Allergic rhinitis or hayfever  Respiratory: Denies cough, wheeze, dyspnea, hemoptysis  Cardiovascular: Denies chest pain, tightness, palpitations, orthopnea or edema  Sleep: See HPI  GI/: Aerophagia   Musculoskeletal: Denies back pain, painful joints, sore muscles  Neurological: Denies vertigo or headaches  Skin: Denies rashes, lesions  Psychiatric: Denies depression or anxiety    Physical exam:   Constitutional: Well-nourished, well-developed, in no acute distress  Neck: supple, trachea midline  Respiratory: no intercostal retractions or accessory muscle use   Lungs auscultation: Clear to auscultation bilaterally  Cardiovascular: Regular rate rhythm no murmurs, rubs or gallops  Musculoskeletal: no clubbing or cyanosis  Skin: No rashes or lesions noted on exposed skin  Neuro: No focal deficit noted  Psychiatric: Oriented to time, person and place.     Diagnosis:  1. LUIS FELIPE (obstructive sleep apnea)  DME OTHER       Plan:  1. Continue CPAP nightly, decrease CPAP pressure 5-10 cm H2O. Pressure change completed in the office  2. Overnight oximetry on CPAP, okay to call for result  3. Clean mask and tubing weekly  4. Follow-up in 4-6 months      "

## 2018-02-05 NOTE — PROCEDURES
Over Night Pulse Oximetry     Indication:to assess the efficacy of the recommended pressure of Auto CPAP 5-10 cm      Impression:   The study was done on Auto CPAP 5-10 cm. The total recording time was 8 hrs 0 min. O2 Sat. pat was 79% and mean O2 sat was 90 % and baseline O2 at 92%. O2 sat was below 88% for 0.3 min of the flow evaluation time. Oxygen Desaturation (>=3%) Index was elevated at 2.6/hr.      Recommendation:  The O2 pat seems like an artifact. Continue CPAP at current pressure. Clinical correlation recommended.

## 2018-02-08 ENCOUNTER — TELEPHONE (OUTPATIENT)
Dept: SLEEP MEDICINE | Facility: MEDICAL CENTER | Age: 43
End: 2018-02-08

## 2018-02-08 NOTE — TELEPHONE ENCOUNTER
----- Message from DANISHA Roman sent at 2/8/2018 12:23 PM PST -----  Please let patient know CNOX normal.

## 2018-03-02 NOTE — PATIENT INSTRUCTIONS
1. Initiate auto CPAP, [facility] to [facility]. You should hear from [facility] in 7-10 business days. If you do not hear from them, please call [phone] and ask to speak with DME coordinator.   2. Follow up in 8-10 weeks to review compliance download, sooner if needed  3. Overnight oximetry through [facility] prior to next visit   no

## 2018-03-22 ENCOUNTER — OFFICE VISIT (OUTPATIENT)
Dept: MEDICAL GROUP | Facility: LAB | Age: 43
End: 2018-03-22
Payer: COMMERCIAL

## 2018-03-22 VITALS
DIASTOLIC BLOOD PRESSURE: 78 MMHG | HEART RATE: 76 BPM | SYSTOLIC BLOOD PRESSURE: 132 MMHG | RESPIRATION RATE: 12 BRPM | OXYGEN SATURATION: 96 % | HEIGHT: 70 IN | TEMPERATURE: 98.2 F | BODY MASS INDEX: 27.63 KG/M2 | WEIGHT: 193 LBS

## 2018-03-22 DIAGNOSIS — Z13.29 SCREENING FOR THYROID DISORDER: ICD-10-CM

## 2018-03-22 DIAGNOSIS — G47.33 OSA (OBSTRUCTIVE SLEEP APNEA): ICD-10-CM

## 2018-03-22 DIAGNOSIS — E55.9 VITAMIN D DEFICIENCY DISEASE: ICD-10-CM

## 2018-03-22 DIAGNOSIS — R73.09 ELEVATED HEMOGLOBIN A1C: ICD-10-CM

## 2018-03-22 DIAGNOSIS — R92.8 ABNORMAL MAMMOGRAM OF RIGHT BREAST: ICD-10-CM

## 2018-03-22 DIAGNOSIS — J06.9 UPPER RESPIRATORY TRACT INFECTION, UNSPECIFIED TYPE: ICD-10-CM

## 2018-03-22 DIAGNOSIS — E78.00 PURE HYPERCHOLESTEROLEMIA: ICD-10-CM

## 2018-03-22 DIAGNOSIS — R63.5 WEIGHT GAIN: ICD-10-CM

## 2018-03-22 PROBLEM — G47.10 HYPERSOMNOLENCE: Status: RESOLVED | Noted: 2017-10-30 | Resolved: 2018-03-22

## 2018-03-22 PROCEDURE — 99215 OFFICE O/P EST HI 40 MIN: CPT | Performed by: PHYSICIAN ASSISTANT

## 2018-03-22 ASSESSMENT — PATIENT HEALTH QUESTIONNAIRE - PHQ9: CLINICAL INTERPRETATION OF PHQ2 SCORE: 0

## 2018-03-22 ASSESSMENT — PAIN SCALES - GENERAL: PAINLEVEL: NO PAIN

## 2018-03-22 NOTE — ASSESSMENT & PLAN NOTE
Last summer- OPO   Treating with APAP (adjustable one)  Has been taken a bit to get use to it but now loves sleeping with.   No further morning headache, energy has increase.

## 2018-03-22 NOTE — ASSESSMENT & PLAN NOTE
Thinking of joining [employer].   Normally skis in winter but skiing hasn't been great.   Diet: yesterday- julián and biscotti, lunch- salmon sandwich with fries, dinner- yogurt, apples and banana, snacks- macadamia nuts and pecans.   Exercise: walk on treadmill 2-3 times per week, more in spring and summer. Swimming, running around.   Denies diarrhea, constipation, intolerance to hot/cold, rash, palpitation

## 2018-03-22 NOTE — ASSESSMENT & PLAN NOTE
11/27/17- mammo  1.  No change since prior mammogram.  2.  Complex cysts at 11:30 and 1:00 positions in the right breast appear stable and are probably benign. Additional follow-up right breast ultrasound is recommended in 6 months at time of bilateral mammography.  No fhx of breast cancer but strong fhx of dense breast and cysts  Never had children.

## 2018-03-22 NOTE — ASSESSMENT & PLAN NOTE
10 days ago cold started.  Stayed out of work 2 days last week.   Nasal congestion, ear popping, st, chest congestion, mild chills, chest discomfort  Has been using albuterol inhaler 2 times daily- helps.   Denies fever, abdominal pain, N/V/D, muscle aches   Treating with tylenol cold alternated with DM cough syrup and albuterol inhaler prn, flonase nightly.   Other people are sick at work.   Has not been able to use CPAP machine.

## 2018-03-22 NOTE — PROGRESS NOTES
Subjective:     Chief Complaint   Patient presents with   • Weight Gain     Janette Swan is a 42 y.o. female here today for vit D, weight gain, luis felipe, mammo discussion, cholesterol as listed below    Vitamin D deficiency disease  6/2017 vit D 27  Treating with OTC vit D3 1,000iu daily.     Weight gain  Thinking of joining [employer].   Normally skis in winter but skiing hasn't been great.   Diet: yesterday- julián and biscotti, lunch- salmon sandwich with fries, dinner- yogurt, apples and banana, snacks- macadamia nuts and pecans.   Exercise: walk on treadmill 2-3 times per week, more in spring and summer. Swimming, running around.   Denies diarrhea, constipation, intolerance to hot/cold, rash, palpitation    Abnormal mammogram of right breast  11/27/17- mammo  1.  No change since prior mammogram.  2.  Complex cysts at 11:30 and 1:00 positions in the right breast appear stable and are probably benign. Additional follow-up right breast ultrasound is recommended in 6 months at time of bilateral mammography.  No fhx of breast cancer but strong fhx of dense breast and cysts  Never had children.     LUIS FELIPE (obstructive sleep apnea)  Last summer- OPO   Treating with APAP (adjustable one)  Has been taken a bit to get use to it but now loves sleeping with.   No further morning headache, energy has increase.      URI (upper respiratory infection)  10 days ago cold started.  Stayed out of work 2 days last week.   Nasal congestion, ear popping, st, chest congestion, mild chills, chest discomfort  Has been using albuterol inhaler 2 times daily- helps.   Denies fever, abdominal pain, N/V/D, muscle aches   Treating with tylenol cold alternated with DM cough syrup and albuterol inhaler prn, flonase nightly.   Other people are sick at work.   Has not been able to use CPAP machine.     Pure hypercholesterolemia  This is chronic. Doing well.  Has never treated with statin therapy. No myalgias, GI upset, or other side effects from  "medication. Denies CP or stroke symptoms.   Diet: yesterday- julián and biscotti, lunch- salmon sandwich with fries, dinner- yogurt, apples and banana, snacks- macadamia nuts and pecans.   Exercise: walk on treadmill 2-3 times per week, more in spring and summer. Swimming, running around.   Results for MACHO GARCIA (MRN [id number]) as of 3/22/2018 13:48   Ref. Range 6/14/2017 08:42   Cholesterol,Tot Latest Ref Range: 100 - 199 mg/dL 195   Triglycerides Latest Ref Range: 0 - 149 mg/dL 138   HDL Latest Ref Range: >=40 mg/dL 48   LDL Latest Ref Range: <100 mg/dL 119 (H)      Current medicines (including changes today)  Current Outpatient Prescriptions   Medication Sig Dispense Refill   • vitamin D3, cholecalciferol, 1000 UNIT Tab Take 1,000 Units by mouth every day.     • albuterol 108 (90 BASE) MCG/ACT Aero Soln inhalation aerosol Inhale 2 Puffs by mouth every 6 hours as needed for Shortness of Breath. 8.5 g 1     No current facility-administered medications for this visit.      She  has a past medical history of Advance directive in chart (08/15/02); Frequent headaches; History of intestinal parasite (1994); History of pneumonia (12/11); IBS (irritable bowel syndrome); Morning headache; and Nephrolithiasis (4/24/2014).    ROS   No chest pain, no shortness of breath, no abdominal pain       Objective:     Blood pressure 132/78, pulse 76, temperature 36.8 °C (98.2 °F), resp. rate 12, height 1.778 m (5' 10\"), weight 87.5 kg (193 lb), last menstrual period 03/06/2018, SpO2 96 %, not currently breastfeeding. Body mass index is 27.69 kg/m².   Physical Exam:  Alert, oriented in no acute distress.  Eye contact is good, speech goal directed, affect calm  HEENT: conjunctiva non-injected, sclera non-icteric, PERRL.  Pinna normal. TM pearly gray. Nares patent, nasal turbinant edematous, clear mucus  Oral mucous membranes slightly injected with no lesions. PND clear.   Neck No adenopathy or masses in the neck or supraclavicular " regions.  Lungs: clear to auscultation bilaterally with good excursion. No W/R/R  CV: regular rate and rhythm.  Abdomen: soft, nontender, no HSM, No CVAT  Ext: no edema, color normal, peripheral pulses 2+, temperature normal    Assessment and Plan:   The following treatment plan was discussed     1. Vitamin D deficiency disease  Mildly deficient, labs ordered continue current regimen  - VITAMIN D,25 HYDROXY; Future    2. Weight gain  Just started, discussed diet, referral sent for nutritionist.   Labs ordered, will call for results  - COMP METABOLIC PANEL; Future  - TSH; Future  - FREE THYROXINE; Future    3. Abnormal mammogram of right breast  discussed this is depth, ordered ultrasound for 6 mo from mammo. Ultrasound has no radiation and that was what pt was concerned with.   - US-BREAST COMPLETE-RIGHT; Future    4. LUIS FELIPE (obstructive sleep apnea)  Improved, pt has more energy.     5. Upper respiratory tract infection, unspecified type  Mildly improving per pt. Continue flonase, OTC cold meds, recommend nasal saline spray, netti pot, mucinex.     6. Pure hypercholesterolemia  Persistent, labs ordered. Referral sent to nutritionist.   - LIPID PROFILE; Future    7. Elevated hemoglobin A1c  Stable, labs ordered. Referral sent to nutritionist.   - COMP METABOLIC PANEL; Future  - HEMOGLOBIN A1C; Future    8. Screening for thyroid disorder  Labs ordered, will call for results  - TSH; Future  - FREE THYROXINE; Future    Greater than 50% of 40 minutes was spent in face-to-face care and coordination regarding cholesterol, uri, vit D, mammo    Followup: Return in about 6 months (around 9/22/2018) for annual .           Please note that this dictation was created using voice recognition software. I have made every reasonable attempt to correct obvious errors, but I expect that there are errors of grammar and possibly content that I did not discover before finalizing the note.

## 2018-03-22 NOTE — ASSESSMENT & PLAN NOTE
This is chronic. Doing well.  Has never treated with statin therapy. No myalgias, GI upset, or other side effects from medication. Denies CP or stroke symptoms.   Diet: yesterday- julián and biscotti, lunch- salmon sandwich with fries, dinner- yogurt, apples and banana, snacks- macadamia nuts and pecans.   Exercise: walk on treadmill 2-3 times per week, more in spring and summer. Swimming, running around.   Results for MACHO GARCIA (MRN [id number]) as of 3/22/2018 13:48   Ref. Range 6/14/2017 08:42   Cholesterol,Tot Latest Ref Range: 100 - 199 mg/dL 195   Triglycerides Latest Ref Range: 0 - 149 mg/dL 138   HDL Latest Ref Range: >=40 mg/dL 48   LDL Latest Ref Range: <100 mg/dL 119 (H)

## 2018-04-24 ENCOUNTER — TELEPHONE (OUTPATIENT)
Dept: MEDICAL GROUP | Facility: LAB | Age: 43
End: 2018-04-24

## 2018-04-24 NOTE — TELEPHONE ENCOUNTER
Please call and make pt appt with Dr. Norman for annual visit or establish care visit sometimes in 9/2018.   Thank you  Hannah

## 2018-06-04 ENCOUNTER — TELEPHONE (OUTPATIENT)
Dept: MEDICAL GROUP | Facility: LAB | Age: 43
End: 2018-06-04

## 2018-06-04 DIAGNOSIS — I49.3 FREQUENT PVCS: ICD-10-CM

## 2018-06-05 NOTE — TELEPHONE ENCOUNTER
----- Message from Anders Yates, Med Ass't sent at 6/4/2018  3:22 PM PDT -----  Regarding: FW: RE: Referral Request  Contact: [phone]      ----- Message -----  From: Janette Swan  Sent: 6/4/2018  12:03 PM  To: Anders Yates, Med Ass't  Subject: RE: RE: Referral Request                         [facility]    ----- Message -----  From: Anders Yates, Med Ass't  Sent: 6/4/18 12:02 PM  To: Janette Swan  Subject: RE: Referral Request    [facility], which ER did you go to?    ----- Message -----     From: Janette Swan     Sent: 6/4/2018 10:04 AM PDT       To: Patient Referral Request Mailing List  Subject: Referral Request    Janette Swan would like to request a referral.  Reason: PVC bigeminy  Requested provider: cardiologist  Comment:  I went to the ER over the weekend and was diagnosed with PVC bigeminy and they recommended I follow up with a cardiologist.  My insurance isn't taken by the doctor the ER referred me to.  Please assist, I have Saray HORVATHO.    Thanks,  Janette Swan  [phone]

## 2018-06-08 ENCOUNTER — SLEEP CENTER VISIT (OUTPATIENT)
Dept: SLEEP MEDICINE | Facility: MEDICAL CENTER | Age: 43
End: 2018-06-08
Payer: COMMERCIAL

## 2018-06-08 VITALS
HEIGHT: 70 IN | WEIGHT: 199 LBS | OXYGEN SATURATION: 96 % | SYSTOLIC BLOOD PRESSURE: 115 MMHG | DIASTOLIC BLOOD PRESSURE: 70 MMHG | RESPIRATION RATE: 15 BRPM | BODY MASS INDEX: 28.49 KG/M2 | HEART RATE: 79 BPM

## 2018-06-08 DIAGNOSIS — G47.33 OSA (OBSTRUCTIVE SLEEP APNEA): ICD-10-CM

## 2018-06-08 DIAGNOSIS — I49.3 PVC (PREMATURE VENTRICULAR CONTRACTION): ICD-10-CM

## 2018-06-08 PROCEDURE — 99213 OFFICE O/P EST LOW 20 MIN: CPT | Performed by: NURSE PRACTITIONER

## 2018-06-08 NOTE — PROGRESS NOTES
Chief Complaint   Patient presents with   • Follow-Up     4 M         HPI: This patient is a 42 y.o. female, who presents for four-month follow-up of obstructive sleep apnea.     Medical history includes IBS, vitamin D deficiency.     Home sleep study confirms mild sleep apnea with an AHI of 8.7, minimum saturation 88%. She was initiated on auto CPAP  in October 2017. She is compliant with auto CPAP 5-15 cm H2O. Compliance report shows 86% use over the past 30 days, average use 6 hours 31 minutes per night, AHI of 2.1.  Patient reports continuing to benefit from therapy.  She denies EDS or a.m. headache.  She is tolerating her machine well.  Since her last visit she has seen cardiology for palpitations, diagnosed with PVCs.  She is pending Holter monitor and stress testing.    Past Medical History:   Diagnosis Date   • Advance directive in chart 08/15/02    NO BLOOD PRODUCTS   • Frequent headaches    • History of intestinal parasite 1994    after [place] trip   • History of pneumonia 12/11   • IBS (irritable bowel syndrome)    • Morning headache    • Nephrolithiasis 4/24/2014       Social History   Substance Use Topics   • Smoking status: Never Smoker   • Smokeless tobacco: Never Used   • Alcohol use 1.2 oz/week     2 Standard drinks or equivalent per week      Comment: 4 drinks per week       Family History   Problem Relation Age of Onset   • Hypertension Mother    • Hyperlipidemia Mother    • Heart Disease Mother 77     MI   • Sleep Apnea Mother    • Hypertension Father    • Hyperlipidemia Father    • Other Father      Alzheimers   • Sleep Apnea Father        Current medications as of today   Current Outpatient Prescriptions   Medication Sig Dispense Refill   • vitamin D3, cholecalciferol, 1000 UNIT Tab Take 1,000 Units by mouth every day.     • albuterol 108 (90 BASE) MCG/ACT Aero Soln inhalation aerosol Inhale 2 Puffs by mouth every 6 hours as needed for Shortness of Breath. 8.5 g 1     No current  "facility-administered medications for this visit.        Allergies: Codeine    Blood pressure 115/70, pulse 79, resp. rate 15, height 1.778 m (5' 10\"), weight 90.3 kg (199 lb), SpO2 96 %.      ROS: As per HPI and otherwise negative if not stated.      Physical exam:   Constitutional: Well-nourished, well-developed, in no acute distress  Eyes: PERRL  Neck: supple, trachea midline  Respiratory: no intercostal retractions or accessory muscle use   Musculoskeletal: no clubbing or cyanosis  Skin: No rashes or lesions noted on exposed skin  Neuro: No focal deficit noted  Psychiatric: Oriented to time, person and place.     Diagnosis:  1. LUIS FELIPE (obstructive sleep apnea)  DME MASK AND SUPPLIES   2. PVC (premature ventricular contraction)         Plan:   The importance of CPAP compliance was again reinforced.  Patient verbalized understanding     1. Continue CPAP nightly  2. Order for new mask and supplies to DME  3. Clean mask & tubing weekly  4. Replace supplies as insurance will allow  5. Follow up annually, sooner if needed    "

## 2018-07-02 ENCOUNTER — TELEPHONE (OUTPATIENT)
Dept: MEDICAL GROUP | Facility: LAB | Age: 43
End: 2018-07-02

## 2018-07-02 DIAGNOSIS — R92.8 ABNORMAL MAMMOGRAM OF RIGHT BREAST: ICD-10-CM

## 2018-07-02 NOTE — TELEPHONE ENCOUNTER
1. Caller Name: Janette Swan                                         Call Back Number: [phone] (home)       Patient approves a detailed voicemail message: N\A    2. SPECIFIC Action To Be Taken: Orders pending, please sign.    3. Diagnosis/Clinical Reason for Request: annual mammogram screening    4. Specialty & Provider Name/Lab/Imaging Location: [facility]    5. Is appointment scheduled for requested order/referral: no

## 2018-12-13 ENCOUNTER — OFFICE VISIT (OUTPATIENT)
Dept: MEDICAL GROUP | Facility: MEDICAL CENTER | Age: 43
End: 2018-12-13
Payer: COMMERCIAL

## 2018-12-13 VITALS
DIASTOLIC BLOOD PRESSURE: 76 MMHG | TEMPERATURE: 98.3 F | RESPIRATION RATE: 16 BRPM | BODY MASS INDEX: 28.03 KG/M2 | HEIGHT: 70 IN | SYSTOLIC BLOOD PRESSURE: 140 MMHG | WEIGHT: 195.8 LBS | HEART RATE: 88 BPM | OXYGEN SATURATION: 96 %

## 2018-12-13 DIAGNOSIS — Z76.89 ESTABLISHING CARE WITH NEW DOCTOR, ENCOUNTER FOR: ICD-10-CM

## 2018-12-13 DIAGNOSIS — R92.8 ABNORMAL MAMMOGRAM OF RIGHT BREAST: ICD-10-CM

## 2018-12-13 DIAGNOSIS — E78.00 PURE HYPERCHOLESTEROLEMIA: ICD-10-CM

## 2018-12-13 DIAGNOSIS — I49.3 FREQUENT PVCS: ICD-10-CM

## 2018-12-13 DIAGNOSIS — G47.33 OSA (OBSTRUCTIVE SLEEP APNEA): ICD-10-CM

## 2018-12-13 PROBLEM — J06.9 URI (UPPER RESPIRATORY INFECTION): Status: RESOLVED | Noted: 2018-03-22 | Resolved: 2018-12-13

## 2018-12-13 PROBLEM — R63.5 WEIGHT GAIN: Status: RESOLVED | Noted: 2018-03-22 | Resolved: 2018-12-13

## 2018-12-13 PROCEDURE — 99214 OFFICE O/P EST MOD 30 MIN: CPT | Performed by: INTERNAL MEDICINE

## 2018-12-13 NOTE — PROGRESS NOTES
Chief Complaint   Patient presents with   • Establish Care   • Apnea     sleep discuss cpap/congestion   • Hypertension     discuss meds     Chief complaint: Establish, history of sleep apnea and PVCs    HISTORY OF PRESENT ILLNESS: Patient is a 43 y.o. female patient who presents today to discuss the evaluation and management of:          1. Establishing care with new doctor, encounter for    Patient was evaluated at [facility] this summer for chest pain/pressure.  She underwent a full workup which was negative.  She did have a Holter monitor which showed frequent PVCs.      2. LUIS FELIPE (obstructive sleep apnea)    Followed by pulmonary sleep clinic.  She has a CPAP machine which she uses regularly, however she has had a hard time using it recently as she has had persistent sinus congestion.  She has an appointment with ENT in early January.    3. Abnormal mammogram of right breast    Patient had abnormal mammogram in November 2017.  She just had her follow-up done last month outside  [facility] system and it was negative.    4. Frequent PVCs    Patient is asymptomatic except for if she has had a lot of coffee or alcohol, therefore she tries to abstain from those.    5. Pure hypercholesterolemia      Results for MACHO GARCIA (MRN [id number]) as of 12/13/2018 12:25   Ref. Range 6/14/2017 08:42   Cholesterol,Tot Latest Ref Range: 100 - 199 mg/dL 195   Triglycerides Latest Ref Range: 0 - 149 mg/dL 138   HDL Latest Ref Range: >=40 mg/dL 48   LDL Latest Ref Range: <100 mg/dL 119 (H)     Patient is planning on starting to exercise more regularly again, she hopes to lose 20 pounds.      Patient Active Problem List    Diagnosis Date Noted   • Frequent PVCs 06/08/2018   • Abnormal mammogram of right breast 03/22/2018   • LUIS FELIPE (obstructive sleep apnea) 10/30/2017   • Vitamin D deficiency disease 08/27/2015   • Pure hypercholesterolemia 08/27/2015   • Irritable bowel syndrome (IBS) 04/25/2014     "    Allergies:Codeine    Current meds including changes today  Current Outpatient Prescriptions   Medication Sig Dispense Refill   • MAGNESIUM PO Take  by mouth.     • vitamin D3, cholecalciferol, 1000 UNIT Tab Take 1,000 Units by mouth every day.       No current facility-administered medications for this visit.      Social History   Substance Use Topics   • Smoking status: Never Smoker   • Smokeless tobacco: Never Used   • Alcohol use 0.6 oz/week     1 Standard drinks or equivalent per week     Social History     Social History Narrative   • No narrative on file       Family History   Problem Relation Age of Onset   • Hypertension Mother    • Hyperlipidemia Mother    • Heart Disease Mother 77        MI   • Sleep Apnea Mother    • Hypertension Father    • Hyperlipidemia Father    • Other Father         Alzheimers   • Sleep Apnea Father        Review of Systems:  No chest pain, No shortness of breath, No dyspnea on exertion  Gastrointestinal ROS: No abdominal pain, No nausea, vomiting, diarrhea, or constipation        Exam:      Blood pressure 140/76, pulse 88, temperature 36.8 °C (98.3 °F), temperature source Temporal, resp. rate 16, height 1.778 m (5' 10\"), weight 88.8 kg (195 lb 12.8 oz), last menstrual period 11/19/2018, SpO2 96 %, not currently breastfeeding.  General:  Well nourished, well developed female in NAD affect and mood within normal limits  Head is grossly normal.  Neck: Supple without adenopathy  Pulmonary: Clear to ausculation.  Normal effort. No rales, rhonchi, or wheezing.  Cardiovascular: Regular rate and rhythm without murmur.   Extremities: no clubbing, cyanosis, or edema.  Neuro: moves all extremities symmetrically    Please note that this dictation was created using voice recognition software. I have made every reasonable attempt to correct obvious errors, but I expect that there are errors of grammar and possibly content that I did not discover before finalizing the " note.    Assessment/Plan:  1. Establishing care with new doctor, encounter for    Up-to-date with screening    2. LUIS FELIPE (obstructive sleep apnea)    Recommend saline rinses and/or Flonase to try to decrease her sinus congestion while she is awaiting her ENT appointment    3. Abnormal mammogram of right breast    Problem resolved with recent mammogram    4. Frequent PVCs        5. Pure hypercholesterolemia    Patient had 21 and me screening done, she does not have gene for familial hyperlipidemia    Followup: No Follow-up on file.

## 2019-01-11 ENCOUNTER — PATIENT MESSAGE (OUTPATIENT)
Dept: SLEEP MEDICINE | Facility: MEDICAL CENTER | Age: 44
End: 2019-01-11

## 2019-01-11 DIAGNOSIS — G47.33 OSA (OBSTRUCTIVE SLEEP APNEA): ICD-10-CM

## 2019-01-11 NOTE — PATIENT COMMUNICATION
LAST SEEN 6/8/18 IRENA Coburn  Plan:   The importance of CPAP compliance was again reinforced.  Patient verbalized understanding      1. Continue CPAP nightly  2. Order for new mask and supplies to DME  3. Clean mask & tubing weekly  4. Replace supplies as insurance will allow  5. Follow up annually, sooner if needed    No pending appt

## 2019-01-11 NOTE — TELEPHONE ENCOUNTER
----- Message from Janette Swan sent at 1/11/2019  7:14 AM PST -----  Regarding: Prescription Question  Contact: [phone]  Nico Gastelum,  I'm experiencing rain out and I'm wondering if I can get a heated hose?  (Also my ENT thinks a heated hose would work better for me and he's prescribed Flonase for my turbinate.) I contacted the respiratory therapist at [facility] and he needs a new order from you to change it.  What do you think?    Janette

## 2019-01-12 NOTE — PATIENT COMMUNICATION
Orders, with supporting documentation faxed to DME:  [facility] /  [phone] / fax [phone]  Pt informed via [employer]

## 2019-04-30 ENCOUNTER — PATIENT MESSAGE (OUTPATIENT)
Dept: MEDICAL GROUP | Facility: MEDICAL CENTER | Age: 44
End: 2019-04-30

## 2019-10-14 ENCOUNTER — HOSPITAL ENCOUNTER (OUTPATIENT)
Dept: LAB | Facility: MEDICAL CENTER | Age: 44
End: 2019-10-14
Attending: PHYSICIAN ASSISTANT
Payer: COMMERCIAL

## 2019-10-14 PROCEDURE — 88175 CYTOPATH C/V AUTO FLUID REDO: CPT

## 2019-10-14 PROCEDURE — 87624 HPV HI-RISK TYP POOLED RSLT: CPT

## 2019-10-17 LAB
CYTOLOGY REG CYTOL: NORMAL
HPV HR 12 DNA CVX QL NAA+PROBE: NEGATIVE
HPV16 DNA SPEC QL NAA+PROBE: NEGATIVE
HPV18 DNA SPEC QL NAA+PROBE: NEGATIVE
SPECIMEN SOURCE: NORMAL

## 2020-04-21 ENCOUNTER — TELEMEDICINE (OUTPATIENT)
Dept: SLEEP MEDICINE | Facility: MEDICAL CENTER | Age: 45
End: 2020-04-21

## 2020-04-21 VITALS
HEIGHT: 70 IN | HEART RATE: 76 BPM | SYSTOLIC BLOOD PRESSURE: 146 MMHG | BODY MASS INDEX: 26.92 KG/M2 | DIASTOLIC BLOOD PRESSURE: 87 MMHG | WEIGHT: 188 LBS | OXYGEN SATURATION: 95 %

## 2020-04-21 DIAGNOSIS — I49.3 FREQUENT PVCS: ICD-10-CM

## 2020-04-21 DIAGNOSIS — Z78.9 NONSMOKER: ICD-10-CM

## 2020-04-21 DIAGNOSIS — G47.33 OSA (OBSTRUCTIVE SLEEP APNEA): ICD-10-CM

## 2020-04-21 DIAGNOSIS — R03.0 ELEVATED BP WITHOUT DIAGNOSIS OF HYPERTENSION: ICD-10-CM

## 2020-04-21 PROCEDURE — 99212 OFFICE O/P EST SF 10 MIN: CPT | Mod: CR,95 | Performed by: NURSE PRACTITIONER

## 2020-04-21 NOTE — PROGRESS NOTES
Telemedicine Visit: Established Patient     This encounter was conducted via Zoom .   Verbal consent was obtained. Patient's identity was verified.    Subjective:   CC: LUIS FELIPE  Janette Swan is a 44 y.o. female presenting for evaluation and management of:    Last OV 6/8/18 with [facility].  Home sleep study confirms mild sleep apnea with an AHI of 8.7, minimum saturation 88%. She was initiated on auto CPAP  in October 2017. She is compliant with auto CPAP 5-10 cm H2O.   Compliance report 3/14/2020 through 4/12/2020 notes 96.7% compliance, average nightly use of 5.5 hours, max use 8.5 hours, mean pressure 5.8 cm, no significant mask leak with a reduced AHI of 0.9/h.  I reviewed finds with patient.  She tolerates mask and pressure well.  She is currently using a nasal mask.  She denies morning headaches.  She feels overall she is sleeping well.    She had sinus sx 8/2019 and trimmed turbinate with deviated septum fixed. She notes some sinus dryness in AM and may remove mask and go back to bed. She notes chronic palpitations was initially placed on metoprolol but caused bradycardia. She denies chest pain/tightness.  She is going for regular walks and walked up to 4 miles today without any respiratory symptoms.    ROS in HPI; otherwise negative.      Allergies   Allergen Reactions   • Codeine Vomiting       Current medicines (including changes today)  Current Outpatient Medications   Medication Sig Dispense Refill   • Norethin-Eth Estrad-Fe Biphas (LO LOESTRIN FE PO) Take  by mouth.     • MAGNESIUM PO Take  by mouth.     • vitamin D3, cholecalciferol, 1000 UNIT Tab Take 1,000 Units by mouth every day.       No current facility-administered medications for this visit.        Patient Active Problem List    Diagnosis Date Noted   • Frequent PVCs 06/08/2018   • Abnormal mammogram of right breast 03/22/2018   • LUIS FELIPE (obstructive sleep apnea) 10/30/2017   • Vitamin D deficiency disease 08/27/2015   • Pure  "hypercholesterolemia 08/27/2015   • Irritable bowel syndrome (IBS) 04/25/2014       Family History   Problem Relation Age of Onset   • Hypertension Mother    • Hyperlipidemia Mother    • Heart Disease Mother 77        MI   • Sleep Apnea Mother    • Hypertension Father    • Hyperlipidemia Father    • Other Father         Alzheimers   • Sleep Apnea Father        She  has a past medical history of Advance directive in chart (08/15/02), Frequent headaches, History of intestinal parasite (1994), History of pneumonia (12/11), IBS (irritable bowel syndrome), Morning headache, and Nephrolithiasis (4/24/2014).  She  has a past surgical history that includes tonsillectomy (1995).       Objective:   Vitals obtained by patient:  Blood pressure: 146/87, Pulse: 76, Pulse ox: 95%, Height: 5'10\" and Weight: 188lbs    Physical Exam:  Constitutional: Alert, no distress, well-groomed.  Skin: No rashes in visible areas.  Eye: Round. Conjunctiva clear, lids normal. No icterus. Glasses.  ENMT: Lips pink without lesions, good dentition, moist mucous membranes. Phonation normal.  Neck: No masses, no thyromegaly. Moves freely without pain.  CV: Pulse as reported by patient  Respiratory: Unlabored respiratory effort, no cough or audible wheeze  Psych: Alert and oriented x3, normal affect and mood.       Assessment and Plan:   The following treatment plan was discussed:     1. LUIS FELIPE (obstructive sleep apnea)    2. Frequent PVCs    3. Elevated BP without diagnosis of hypertension    4. Nonsmoker    5. BMI 26.0-26.9,adult    Other orders  - Norethin-Eth Estrad-Fe Biphas (LO LOESTRIN FE PO); Take  by mouth.  - DME Mask and Supplies    Continue auto CPAP 5 to 10 cm nightly.  DME mask/supplies.  Follow-up with primary care for other health concerns including hypertension and cardiology if chronic PVCs become more persistent.  Encouraged continued regular walking.      Follow-up: Return in about 1 year (around 4/21/2021) for [facility], [facility] if needed., With Compliance Card.

## 2020-11-10 ENCOUNTER — APPOINTMENT (RX ONLY)
Dept: URBAN - METROPOLITAN AREA CLINIC 4 | Facility: CLINIC | Age: 45
Setting detail: DERMATOLOGY
End: 2020-11-10

## 2020-11-10 DIAGNOSIS — L50.1 IDIOPATHIC URTICARIA: ICD-10-CM

## 2020-11-10 DIAGNOSIS — T07XXXA INSECT BITE, NONVENOMOUS, OF OTHER, MULTIPLE, AND UNSPECIFIED SITES, WITHOUT MENTION OF INFECTION: ICD-10-CM

## 2020-11-10 DIAGNOSIS — D22 MELANOCYTIC NEVI: ICD-10-CM

## 2020-11-10 DIAGNOSIS — D18.0 HEMANGIOMA: ICD-10-CM

## 2020-11-10 DIAGNOSIS — L82.1 OTHER SEBORRHEIC KERATOSIS: ICD-10-CM

## 2020-11-10 DIAGNOSIS — L71.8 OTHER ROSACEA: ICD-10-CM

## 2020-11-10 DIAGNOSIS — L91.8 OTHER HYPERTROPHIC DISORDERS OF THE SKIN: ICD-10-CM

## 2020-11-10 DIAGNOSIS — L72.0 EPIDERMAL CYST: ICD-10-CM

## 2020-11-10 PROBLEM — S20.461A INSECT BITE (NONVENOMOUS) OF RIGHT BACK WALL OF THORAX, INITIAL ENCOUNTER: Status: ACTIVE | Noted: 2020-11-10

## 2020-11-10 PROBLEM — D18.01 HEMANGIOMA OF SKIN AND SUBCUTANEOUS TISSUE: Status: ACTIVE | Noted: 2020-11-10

## 2020-11-10 PROBLEM — D22.5 MELANOCYTIC NEVI OF TRUNK: Status: ACTIVE | Noted: 2020-11-10

## 2020-11-10 PROCEDURE — 99203 OFFICE O/P NEW LOW 30 MIN: CPT | Mod: 25

## 2020-11-10 PROCEDURE — ? PRESCRIPTION

## 2020-11-10 PROCEDURE — ? COUNSELING

## 2020-11-10 PROCEDURE — 11200 RMVL SKIN TAGS UP TO&INC 15: CPT

## 2020-11-10 PROCEDURE — ? ADDITIONAL NOTES

## 2020-11-10 PROCEDURE — ? SKIN TAG REMOVAL

## 2020-11-10 RX ORDER — METRONIDAZOLE 7.5 MG/G
CREAM TOPICAL
Qty: 1 | Refills: 9 | Status: ERX | COMMUNITY
Start: 2020-11-10

## 2020-11-10 RX ADMIN — METRONIDAZOLE 1: 7.5 CREAM TOPICAL at 00:00

## 2020-11-10 ASSESSMENT — LOCATION DETAILED DESCRIPTION DERM
LOCATION DETAILED: RIGHT POSTERIOR AXILLA
LOCATION DETAILED: LEFT INFERIOR CENTRAL MALAR CHEEK
LOCATION DETAILED: EPIGASTRIC SKIN
LOCATION DETAILED: RIGHT LATERAL UPPER BACK
LOCATION DETAILED: RIGHT ANTERIOR AXILLA
LOCATION DETAILED: RIGHT INFERIOR CENTRAL MALAR CHEEK
LOCATION DETAILED: LEFT AXILLARY VAULT
LOCATION DETAILED: RIGHT MEDIAL FOREHEAD
LOCATION DETAILED: RIGHT MEDIAL MALAR CHEEK
LOCATION DETAILED: RIGHT POSTERIOR EAR
LOCATION DETAILED: RIGHT INFERIOR LATERAL UPPER BACK
LOCATION DETAILED: RIGHT AXILLARY VAULT
LOCATION DETAILED: INFERIOR THORACIC SPINE
LOCATION DETAILED: LEFT CENTRAL MALAR CHEEK

## 2020-11-10 ASSESSMENT — LOCATION ZONE DERM
LOCATION ZONE: FACE
LOCATION ZONE: EAR
LOCATION ZONE: TRUNK
LOCATION ZONE: AXILLAE

## 2020-11-10 ASSESSMENT — LOCATION SIMPLE DESCRIPTION DERM
LOCATION SIMPLE: RIGHT ANTERIOR AXILLA
LOCATION SIMPLE: RIGHT CHEEK
LOCATION SIMPLE: LEFT CHEEK
LOCATION SIMPLE: RIGHT POSTERIOR AXILLA
LOCATION SIMPLE: RIGHT EAR
LOCATION SIMPLE: UPPER BACK
LOCATION SIMPLE: RIGHT AXILLARY VAULT
LOCATION SIMPLE: ABDOMEN
LOCATION SIMPLE: LEFT AXILLARY VAULT
LOCATION SIMPLE: RIGHT FOREHEAD
LOCATION SIMPLE: RIGHT UPPER BACK

## 2020-11-10 NOTE — PROCEDURE: SKIN TAG REMOVAL
Consent: Written consent obtained and the risks of skin tag removal was reviewed with the patient including but not limited to bleeding, pigmentary change, infection, pain, and remote possibility of scarring.
Medical Necessity Clause: This procedure was medically necessary because the lesions that were treated were:
Anesthesia Volume In Cc: 3
Add Associated Diagnoses If Applicable When Selecting Medical Necessity: Yes
Include Z78.9 (Other Specified Conditions Influencing Health Status) As An Associated Diagnosis?: No
Anesthesia Type: 1% lidocaine with epinephrine
Detail Level: Detailed
Medical Necessity Information: It is in your best interest to select a reason for this procedure from the list below. All of these items fulfill various CMS LCD requirements except the new and changing color options.

## 2020-11-10 NOTE — PROCEDURE: COUNSELING
Detail Level: Detailed
Patient Specific Counseling (Will Not Stick From Patient To Patient): Rx of metrogel sent for patient

## 2020-11-10 NOTE — HPI: SECONDARY COMPLAINT
How Severe Is This Condition?: mild
Additional History: Patient states that she does have a history of rosacea.

## 2020-11-11 ENCOUNTER — APPOINTMENT (RX ONLY)
Dept: URBAN - METROPOLITAN AREA CLINIC 20 | Facility: CLINIC | Age: 45
Setting detail: DERMATOLOGY
End: 2020-11-11

## 2020-11-11 NOTE — HPI: COSMETIC CONSULTATION
Additional History: Patient is here to discuss aging. She lives an outdoor life style, in high elevation, and ski’s quite often. She’s concerned with fine lines, especially 11’s, and excess skin in her neck. Aneta suggests Botox and Kabela with Elizabet. Aneta also suggests overall skin resurfacing, treat blood vessels, and hyperpigmentation. She suggests a series of three treatments of BBL face and neck. Then possibly treating her with a Fraxel treatment on the third treatment. Gave her samples of EltaD Elements. Patient does not get cold sores.
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
## 2020-11-16 ENCOUNTER — OFFICE VISIT (OUTPATIENT)
Dept: MEDICAL GROUP | Facility: MEDICAL CENTER | Age: 45
End: 2020-11-16
Payer: COMMERCIAL

## 2020-11-16 VITALS
TEMPERATURE: 98.6 F | BODY MASS INDEX: 27.46 KG/M2 | HEIGHT: 70 IN | DIASTOLIC BLOOD PRESSURE: 82 MMHG | WEIGHT: 191.8 LBS | RESPIRATION RATE: 16 BRPM | OXYGEN SATURATION: 100 % | SYSTOLIC BLOOD PRESSURE: 124 MMHG | HEART RATE: 73 BPM

## 2020-11-16 DIAGNOSIS — R22.1 NECK SWELLING: ICD-10-CM

## 2020-11-16 DIAGNOSIS — R19.5 OCCULT BLOOD IN STOOLS: ICD-10-CM

## 2020-11-16 DIAGNOSIS — M54.2 CHRONIC NECK PAIN: ICD-10-CM

## 2020-11-16 DIAGNOSIS — E55.9 VITAMIN D DEFICIENCY: ICD-10-CM

## 2020-11-16 DIAGNOSIS — G47.33 OSA (OBSTRUCTIVE SLEEP APNEA): Chronic | ICD-10-CM

## 2020-11-16 DIAGNOSIS — I49.3 FREQUENT PVCS: ICD-10-CM

## 2020-11-16 DIAGNOSIS — G56.03 BILATERAL CARPAL TUNNEL SYNDROME: ICD-10-CM

## 2020-11-16 DIAGNOSIS — K58.9 IRRITABLE BOWEL SYNDROME, UNSPECIFIED TYPE: ICD-10-CM

## 2020-11-16 DIAGNOSIS — G89.29 CHRONIC NECK PAIN: ICD-10-CM

## 2020-11-16 PROCEDURE — 99204 OFFICE O/P NEW MOD 45 MIN: CPT | Performed by: FAMILY MEDICINE

## 2020-11-16 RX ORDER — METOPROLOL SUCCINATE 25 MG/1
25 TABLET, EXTENDED RELEASE ORAL DAILY
COMMUNITY
End: 2022-03-09

## 2020-11-16 SDOH — HEALTH STABILITY: MENTAL HEALTH: HOW OFTEN DO YOU HAVE A DRINK CONTAINING ALCOHOL?: 2-4 TIMES A MONTH

## 2020-11-16 ASSESSMENT — PATIENT HEALTH QUESTIONNAIRE - PHQ9: CLINICAL INTERPRETATION OF PHQ2 SCORE: 0
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
## 2020-11-16 NOTE — ASSESSMENT & PLAN NOTE
Chronic issue, flares when stressed.  Previously well controlled with imodium OTC prn.  Last colonoscopy was 2005 and normal per patient report with some diverticuli.  She would like a repeat colonoscopy.   She did a half marathon 3 years ago and had blood in her stool afterwards.  She stopped running and the blood resolved.  She would like to have a repeat colonoscopy prior to running again.

## 2020-11-16 NOTE — ASSESSMENT & PLAN NOTE
She is taking metoprolol 25mg daily.  Only has palpitations if she has caffeine or very high stress.  No current symptoms.  No chest pain, headaches, dizziness.  She is getting metoprolol from her cardiologist with next appt in 1 month.

## 2020-12-01 ENCOUNTER — APPOINTMENT (RX ONLY)
Dept: URBAN - METROPOLITAN AREA CLINIC 20 | Facility: CLINIC | Age: 45
Setting detail: DERMATOLOGY
End: 2020-12-01

## 2020-12-01 DIAGNOSIS — Z41.9 ENCOUNTER FOR PROCEDURE FOR PURPOSES OTHER THAN REMEDYING HEALTH STATE, UNSPECIFIED: ICD-10-CM

## 2020-12-01 PROCEDURE — ? SCITON BBL

## 2020-12-01 ASSESSMENT — LOCATION DETAILED DESCRIPTION DERM
LOCATION DETAILED: LEFT INFERIOR CENTRAL MALAR CHEEK
LOCATION DETAILED: LEFT LOWER CUTANEOUS LIP
LOCATION DETAILED: SUPERIOR MID FOREHEAD
LOCATION DETAILED: LEFT SUPERIOR MEDIAL BUCCAL CHEEK
LOCATION DETAILED: RIGHT INFERIOR ANTERIOR NECK
LOCATION DETAILED: LEFT INFERIOR ANTERIOR NECK
LOCATION DETAILED: LEFT INFERIOR MEDIAL FOREHEAD
LOCATION DETAILED: RIGHT INFERIOR CENTRAL MALAR CHEEK

## 2020-12-01 ASSESSMENT — LOCATION ZONE DERM
LOCATION ZONE: NECK
LOCATION ZONE: FACE
LOCATION ZONE: LIP

## 2020-12-01 ASSESSMENT — LOCATION SIMPLE DESCRIPTION DERM
LOCATION SIMPLE: LEFT CHEEK
LOCATION SIMPLE: LEFT FOREHEAD
LOCATION SIMPLE: RIGHT CHEEK
LOCATION SIMPLE: LEFT ANTERIOR NECK
LOCATION SIMPLE: SUPERIOR FOREHEAD
LOCATION SIMPLE: RIGHT ANTERIOR NECK
LOCATION SIMPLE: LEFT LIP

## 2020-12-01 NOTE — PROCEDURE: SCITON BBL
Repetition Rate (Hz): 10
Cooling (In C): 22
Pulse Duration Units: milliseconds
Detail Level: Zone
Passes: 1
Fluence (J/Cm2): 8
Spot Size: Finesse Adapter Size: 15 x 45 mm (No Finesse Adapter)
Cooling (In C): 15
Cooling ?: Yes
Additional Comments (Optional): same setting with 15x15
Consent: Written consent obtained, risks reviewed including but not limited to crusting, scabbing, blistering, scarring, darker or lighter pigmentary change, bruising, and/or incomplete response.
Price (Use Numbers Only, No Special Characters Or $): 550.00
Fluence (J/Cm2): 25
Post-Care Instructions: I reviewed with the patient in detail post-care instructions. Patient should stay away from the sun and wear sun protection until treated areas are fully healed.
Pulse Duration: 20
Spot Size: Finesse Adapter Size: 15 x 15 mm square
Anesthesia Volume In Cc: 0
Hide Repetition Rate?: No
Preprocedure Text: The treatment areas were thoroughly cleaned. Any exposed at risk hair that was not to be treated was covered in white paper tape. Clear ultrasound gel was applied to the treatment area. The area was treated with no immediate stacking of pulses.
Post Procedure Text: The patient tolerated the procedure well. Ice-chilled washclothes were applied to the treatment area for comfort. Post care was reviewed with the patient.

## 2020-12-28 ENCOUNTER — HOSPITAL ENCOUNTER (OUTPATIENT)
Dept: LAB | Facility: MEDICAL CENTER | Age: 45
End: 2020-12-28
Attending: FAMILY MEDICINE
Payer: COMMERCIAL

## 2020-12-28 DIAGNOSIS — E55.9 VITAMIN D DEFICIENCY: ICD-10-CM

## 2020-12-28 DIAGNOSIS — I49.3 FREQUENT PVCS: ICD-10-CM

## 2020-12-28 DIAGNOSIS — R19.5 OCCULT BLOOD IN STOOLS: ICD-10-CM

## 2020-12-28 DIAGNOSIS — R22.1 NECK SWELLING: ICD-10-CM

## 2020-12-28 LAB
25(OH)D3 SERPL-MCNC: 43 NG/ML (ref 30–100)
ALBUMIN SERPL BCP-MCNC: 3.8 G/DL (ref 3.2–4.9)
ALBUMIN/GLOB SERPL: 1.2 G/DL
ALP SERPL-CCNC: 40 U/L (ref 30–99)
ALT SERPL-CCNC: 16 U/L (ref 2–50)
ANION GAP SERPL CALC-SCNC: 11 MMOL/L (ref 7–16)
AST SERPL-CCNC: 14 U/L (ref 12–45)
BASOPHILS # BLD AUTO: 0.3 % (ref 0–1.8)
BASOPHILS # BLD: 0.03 K/UL (ref 0–0.12)
BILIRUB SERPL-MCNC: 0.2 MG/DL (ref 0.1–1.5)
BUN SERPL-MCNC: 6 MG/DL (ref 8–22)
CALCIUM SERPL-MCNC: 9.4 MG/DL (ref 8.5–10.5)
CHLORIDE SERPL-SCNC: 101 MMOL/L (ref 96–112)
CHOLEST SERPL-MCNC: 202 MG/DL (ref 100–199)
CO2 SERPL-SCNC: 23 MMOL/L (ref 20–33)
CREAT SERPL-MCNC: 0.71 MG/DL (ref 0.5–1.4)
EOSINOPHIL # BLD AUTO: 0.14 K/UL (ref 0–0.51)
EOSINOPHIL NFR BLD: 1.5 % (ref 0–6.9)
ERYTHROCYTE [DISTWIDTH] IN BLOOD BY AUTOMATED COUNT: 43.4 FL (ref 35.9–50)
FASTING STATUS PATIENT QL REPORTED: NORMAL
GLOBULIN SER CALC-MCNC: 3.2 G/DL (ref 1.9–3.5)
GLUCOSE SERPL-MCNC: 87 MG/DL (ref 65–99)
HCT VFR BLD AUTO: 42.2 % (ref 37–47)
HDLC SERPL-MCNC: 51 MG/DL
HGB BLD-MCNC: 13.7 G/DL (ref 12–16)
IMM GRANULOCYTES # BLD AUTO: 0.03 K/UL (ref 0–0.11)
IMM GRANULOCYTES NFR BLD AUTO: 0.3 % (ref 0–0.9)
LDLC SERPL CALC-MCNC: 115 MG/DL
LYMPHOCYTES # BLD AUTO: 4.53 K/UL (ref 1–4.8)
LYMPHOCYTES NFR BLD: 47.7 % (ref 22–41)
MCH RBC QN AUTO: 29.2 PG (ref 27–33)
MCHC RBC AUTO-ENTMCNC: 32.5 G/DL (ref 33.6–35)
MCV RBC AUTO: 90 FL (ref 81.4–97.8)
MONOCYTES # BLD AUTO: 0.49 K/UL (ref 0–0.85)
MONOCYTES NFR BLD AUTO: 5.2 % (ref 0–13.4)
NEUTROPHILS # BLD AUTO: 4.28 K/UL (ref 2–7.15)
NEUTROPHILS NFR BLD: 45 % (ref 44–72)
NRBC # BLD AUTO: 0 K/UL
NRBC BLD-RTO: 0 /100 WBC
PLATELET # BLD AUTO: 339 K/UL (ref 164–446)
PMV BLD AUTO: 9.3 FL (ref 9–12.9)
POTASSIUM SERPL-SCNC: 4.1 MMOL/L (ref 3.6–5.5)
PROT SERPL-MCNC: 7 G/DL (ref 6–8.2)
RBC # BLD AUTO: 4.69 M/UL (ref 4.2–5.4)
SODIUM SERPL-SCNC: 135 MMOL/L (ref 135–145)
T4 FREE SERPL-MCNC: 1.06 NG/DL (ref 0.93–1.7)
TRIGL SERPL-MCNC: 179 MG/DL (ref 0–149)
TSH SERPL DL<=0.005 MIU/L-ACNC: 4.43 UIU/ML (ref 0.38–5.33)
WBC # BLD AUTO: 9.5 K/UL (ref 4.8–10.8)

## 2020-12-28 PROCEDURE — 84439 ASSAY OF FREE THYROXINE: CPT

## 2020-12-28 PROCEDURE — 80053 COMPREHEN METABOLIC PANEL: CPT

## 2020-12-28 PROCEDURE — 80061 LIPID PANEL: CPT

## 2020-12-28 PROCEDURE — 36415 COLL VENOUS BLD VENIPUNCTURE: CPT

## 2020-12-28 PROCEDURE — 84443 ASSAY THYROID STIM HORMONE: CPT

## 2020-12-28 PROCEDURE — 82306 VITAMIN D 25 HYDROXY: CPT

## 2020-12-28 PROCEDURE — 85025 COMPLETE CBC W/AUTO DIFF WBC: CPT

## 2021-01-11 ENCOUNTER — HOSPITAL ENCOUNTER (OUTPATIENT)
Dept: RADIOLOGY | Facility: MEDICAL CENTER | Age: 46
End: 2021-01-11
Attending: FAMILY MEDICINE
Payer: COMMERCIAL

## 2021-01-11 DIAGNOSIS — R22.1 NECK SWELLING: ICD-10-CM

## 2021-01-11 PROCEDURE — 76536 US EXAM OF HEAD AND NECK: CPT

## 2021-02-02 ENCOUNTER — OFFICE VISIT (OUTPATIENT)
Dept: PHYSICAL MEDICINE AND REHAB | Facility: MEDICAL CENTER | Age: 46
End: 2021-02-02
Payer: COMMERCIAL

## 2021-02-02 VITALS
HEIGHT: 70 IN | DIASTOLIC BLOOD PRESSURE: 80 MMHG | OXYGEN SATURATION: 97 % | HEART RATE: 75 BPM | SYSTOLIC BLOOD PRESSURE: 124 MMHG | TEMPERATURE: 97.6 F | BODY MASS INDEX: 27.81 KG/M2 | WEIGHT: 194.22 LBS

## 2021-02-02 DIAGNOSIS — M54.2 CERVICALGIA: ICD-10-CM

## 2021-02-02 DIAGNOSIS — R20.2 PARESTHESIA: ICD-10-CM

## 2021-02-02 DIAGNOSIS — M50.30 DDD (DEGENERATIVE DISC DISEASE), CERVICAL: ICD-10-CM

## 2021-02-02 PROCEDURE — 99204 OFFICE O/P NEW MOD 45 MIN: CPT | Performed by: PHYSICAL MEDICINE & REHABILITATION

## 2021-02-02 ASSESSMENT — FIBROSIS 4 INDEX: FIB4 SCORE: 0.49

## 2021-02-02 ASSESSMENT — PAIN SCALES - GENERAL: PAINLEVEL: 2=MINIMAL-SLIGHT

## 2021-02-02 ASSESSMENT — PATIENT HEALTH QUESTIONNAIRE - PHQ9: CLINICAL INTERPRETATION OF PHQ2 SCORE: 0

## 2021-02-02 NOTE — PROGRESS NOTES
"New patient note    Physiatry (physical medicine and  Rehabilitation), interventional spine and sports medicine    Date of Service: 02/02/2021    Chief complaint:   Chief Complaint   Patient presents with   • New Patient     Neck pain and Bilateral carpal tunnel       HISTORY    HPI: Janette Swan 45 y.o. female who presents today for evaluation of neck pain.  From what she reports, she has been very active over the years.  She goes to the chiropractor from time to time.      Her pain started to increase.  She has been told that she has degenerative changes in her neck.  At times, she wakes up with numbness and tingling in the 4-5th digits.  Changing her pillow, this seemed to help.  Then tingling has significantly decreased.  She has increased her bicycle riding and gripping the bars of her bicycle or motorcycle, symptoms come back.  She has a history of a fall in September 2020 in which she did hit her head without loss of consciousness.   No functional deficits reported related.    She also reports that she \"dislocated a rib\" about 20 years, she thinks on the left.    Pain is a 2/10 on the now and ranges from 2-4/10 on the NRS.    No change with coughing or sneezing.  No balance issues, she is not dropping things.       Medical records review:  I reviewed the note from the referring provider Korin Garcia D.O.    Previous treatments:    Physical Therapy: No    Medications the patient is tried: tylenol 500mg prn, tylenol pm, advil prn    Previous interventions: none    Previous surgeries to relieve the above pain:  none      ROS:   ENT: sinus dry with CPAP  CV: occasional PVCs, has seen cardiology   GI: history of IBS  Red Flags ROS:   Fever, Chills, Sweats: Denies  Involuntary Weight Loss: Denies  Bladder Incontinence: Denies  Bowel Incontinence: Denies  Saddle Anesthesia: Denies    All other systems reviewed and negative.       PMHx:   Past Medical History:   Diagnosis Date   • Advance directive in " chart 08/15/02    NO BLOOD PRODUCTS   • Frequent headaches    • History of intestinal parasite 1994    after [place] trip   • History of pneumonia 12/11   • IBS (irritable bowel syndrome)    • Morning headache    • Nephrolithiasis 4/24/2014       PSHx:   Past Surgical History:   Procedure Laterality Date   • TONSILLECTOMY  1995   • TURBINECTOMY         Family history   Family History   Problem Relation Age of Onset   • Hypertension Mother    • Hyperlipidemia Mother    • Heart Disease Mother 77        MI   • Sleep Apnea Mother    • Hypertension Father    • Hyperlipidemia Father    • Other Father         Alzheimers   • Sleep Apnea Father    • Diabetes Other          Medications:   Current Outpatient Medications   Medication   • metoprolol SR (TOPROL XL) 25 MG TABLET SR 24 HR   • Norethin-Eth Estrad-Fe Biphas (LO LOESTRIN FE PO)   • MAGNESIUM PO   • vitamin D3, cholecalciferol, 1000 UNIT Tab   • metronidazole (METROCREAM) 0.75 % cream     No current facility-administered medications for this visit.        Allergies:   Allergies   Allergen Reactions   • Codeine Vomiting       Social Hx:   Social History     Socioeconomic History   • Marital status:      Spouse name: Not on file   • Number of children: Not on file   • Years of education: Not on file   • Highest education level: Not on file   Occupational History   • Not on file   Social Needs   • Financial resource strain: Not on file   • Food insecurity     Worry: Not on file     Inability: Not on file   • Transportation needs     Medical: Not on file     Non-medical: Not on file   Tobacco Use   • Smoking status: Never Smoker   • Smokeless tobacco: Never Used   Substance and Sexual Activity   • Alcohol use: Yes     Alcohol/week: 0.6 oz     Types: 1 Standard drinks or equivalent per week     Frequency: 2-4 times a month   • Drug use: Yes     Types: Marijuana, Oral     Comment: occ   • Sexual activity: Yes     Partners: Male     Comment: vasectomy   Lifestyle   •  "Physical activity     Days per week: Not on file     Minutes per session: Not on file   • Stress: Not on file   Relationships   • Social connections     Talks on phone: Not on file     Gets together: Not on file     Attends Rastafari service: Not on file     Active member of club or organization: Not on file     Attends meetings of clubs or organizations: Not on file     Relationship status: Not on file   • Intimate partner violence     Fear of current or ex partner: Not on file     Emotionally abused: Not on file     Physically abused: Not on file     Forced sexual activity: Not on file   Other Topics Concern   •  Service No   • Blood Transfusions No   • Caffeine Concern No   • Occupational Exposure No   • Hobby Hazards Yes   • Sleep Concern Yes   • Stress Concern No   • Weight Concern No   • Special Diet No   • Back Care No   • Exercise Yes   • Bike Helmet Yes   • Seat Belt Yes   • Self-Exams No   Social History Narrative   • Not on file         EXAMINATION     Physical Exam:   Vitals: /80 (BP Location: Right arm, Patient Position: Sitting, BP Cuff Size: Adult long)   Pulse 75   Temp 36.4 °C (97.6 °F) (Temporal)   Ht 1.778 m (5' 10\")   Wt 88.1 kg (194 lb 3.6 oz)   SpO2 97%     Constitutional:   Body Habitus: Body mass index is 27.87 kg/m².  Cooperation: Fully cooperates with exam  Appearance: Well-groomed, well-nourished, not disheveled, in no acute distress    Eyes: No scleral icterus, no proptosis     ENT -no obvious auditory deficits, wearing a face mask    Skin -no rashes or lesions noted     Respiratory-  breathing comfortable on room air, no audible wheezing    Cardiovascular- capillary refills less than 2 seconds. No lower extremity edema is noted.     Psychiatric- alert and oriented ×3. Normal affect.     Gait - normal gait, no use of ambulatory device, nonantalgic. Heel and toe walking itnact.     Musculoskeletal -   Cervical spine   Inspection: No deformities of the skin over the " cervical spine. No rashes or lesions.    full  A/P ROM in all directions, without  pain      Spurling’s sign: negative bilaterally    No signs of muscular atrophy in bilateral upper extremities     No tenderness to palpation of the cervical facets    Thoracic/Lumbar Spine/Sacral Spine/Hips   Inspection: No evidence of atrophy in bilateral lower extremities throughout       Neuro       Key points for the international standards for neurological classification of spinal cord injury (ISNCSCI) to light touch.     Dermatome R L   C4 2 2   C5 2 2   C6 2 2   C7 2 2   C8 2 2   T1 2 2   T2 2 2   L2 2 2   L3 2 2   L4 2 2   L5 2 2   S1 2 2   S2 2 2         Motor Exam Upper Extremities   ? Myotome R L   Shoulder flexion C5 5 5   Elbow flexion C5 5 5   Wrist extension C6 5 5   Elbow extension C7 5 5   Finger flexion C8 5 5   Finger abduction T1 5 5         Motor Exam Lower Extremities    ? Myotome R L   Hip flexion L2 5 5   Knee extension L3 5 5   Ankle dorsiflexion L4 5 5   Toe extension L5 5 5   Ankle plantarflexion S1 5 5       Tinel's is positive at the wrists and negative at the elbows bilaterally  Hudson’s sign negative bilaterally   Babinski sign negative bilaterally   Clonus of the ankle negative bilaterally     Reflexes  ?  R L   Biceps  2+ 2+   Brachioradialis  2+ 2+   Patella  2+ 2+   Achilles   2+ 2+       MEDICAL DECISION MAKING    Medical records review: see under HPI section.     DATA    Labs:   Lab Results   Component Value Date/Time    SODIUM 135 12/28/2020 07:11 AM    POTASSIUM 4.1 12/28/2020 07:11 AM    CHLORIDE 101 12/28/2020 07:11 AM    CO2 23 12/28/2020 07:11 AM    ANION 11.0 12/28/2020 07:11 AM    GLUCOSE 87 12/28/2020 07:11 AM    BUN 6 (L) 12/28/2020 07:11 AM    CREATININE 0.71 12/28/2020 07:11 AM    CALCIUM 9.4 12/28/2020 07:11 AM    ASTSGOT 14 12/28/2020 07:11 AM    ALTSGPT 16 12/28/2020 07:11 AM    TBILIRUBIN 0.2 12/28/2020 07:11 AM    ALBUMIN 3.8 12/28/2020 07:11 AM    TOTPROTEIN 7.0 12/28/2020 07:11  AM    GLOBULIN 3.2 12/28/2020 07:11 AM    AGRATIO 1.2 12/28/2020 07:11 AM       No results found for: PROTHROMBTM, INR     Lab Results   Component Value Date/Time    WBC 9.5 12/28/2020 07:11 AM    RBC 4.69 12/28/2020 07:11 AM    HEMOGLOBIN 13.7 12/28/2020 07:11 AM    HEMATOCRIT 42.2 12/28/2020 07:11 AM    MCV 90.0 12/28/2020 07:11 AM    MCH 29.2 12/28/2020 07:11 AM    MCHC 32.5 (L) 12/28/2020 07:11 AM    MPV 9.3 12/28/2020 07:11 AM    NEUTSPOLYS 45.00 12/28/2020 07:11 AM    LYMPHOCYTES 47.70 (H) 12/28/2020 07:11 AM    MONOCYTES 5.20 12/28/2020 07:11 AM    EOSINOPHILS 1.50 12/28/2020 07:11 AM    BASOPHILS 0.30 12/28/2020 07:11 AM        Lab Results   Component Value Date/Time    HBA1C 5.8 (H) 05/09/2014 09:01 AM        Imaging: I personally reviewed following images, these are my reads  None available for review    IMAGING radiology reads. I reviewed the following radiology reads     [facility] 06/1/2020  Impression:  Reversal of normal cervical lordosis with minimal anterolisthesis of C3 over C4 and disc space narrowing and degenerative endplate changes at C6-7.                                                                                                                                                                                        Diagnosis   Visit Diagnoses     ICD-10-CM   1. Cervicalgia  M54.2   2. Paresthesia  R20.2   3. DDD (degenerative disc disease), cervical  M50.30           ASSESSMENT:  Jan[facility] Swan 45 [facility]    Diagnoses and all orders for this visit:    Cervicalgia  -     REFERRAL TO PHYSICAL THERAPY  -     REFERRAL TO EMG - PHYSIATRY (PMR)    Paresthesia  -     REFERRAL TO PHYSICAL THERAPY  -     REFERRAL TO EMG - PHYSIATRY (PMR)    DDD (degenerative disc disease), cervical        1. Trial physical therapy for cervicalgia and paresthesias.  2. Discussed diagnostic EMG of the bilaterally upper extremities to evaluate for cervical  radiculopathy vs. Peripheral nerve entrapment.   3. Continue to follow-up with PCP for general medical complaints    Outside records requested:  The patient signed outside records request form for her outside records including outside images push images from [facility]      Follow-up: Return in about 6 weeks (around 3/16/2021) for EMG.      Thank you very much for asking me to participate in Janette Swan's care.  Please contact me with any questions or concerns.      Please note that this dictation was created using voice recognition software. I have made every reasonable attempt to correct obvious errors but there may be errors of grammar and content that I may have overlooked prior to finalization of this note.      Carlos An MD  Physical Medicine and Rehabilitation  Interventional Spine and Sports Physiatry  [facility] of the [facility]           Korin Davis D.O.

## 2021-02-09 ENCOUNTER — HOSPITAL ENCOUNTER (OUTPATIENT)
Dept: LAB | Facility: MEDICAL CENTER | Age: 46
End: 2021-02-09
Attending: PHYSICIAN ASSISTANT
Payer: COMMERCIAL

## 2021-02-09 LAB — CYTOLOGY REG CYTOL: NORMAL

## 2021-02-09 PROCEDURE — 88175 CYTOPATH C/V AUTO FLUID REDO: CPT

## 2021-02-10 ENCOUNTER — HOSPITAL ENCOUNTER (OUTPATIENT)
Dept: RADIOLOGY | Facility: MEDICAL CENTER | Age: 46
End: 2021-02-10
Payer: COMMERCIAL

## 2021-04-01 ENCOUNTER — HOSPITAL ENCOUNTER (OUTPATIENT)
Dept: RADIOLOGY | Facility: MEDICAL CENTER | Age: 46
End: 2021-04-01
Payer: COMMERCIAL

## 2021-04-13 ENCOUNTER — PATIENT MESSAGE (OUTPATIENT)
Dept: MEDICAL GROUP | Facility: MEDICAL CENTER | Age: 46
End: 2021-04-13

## 2021-04-16 ENCOUNTER — TELEMEDICINE (OUTPATIENT)
Dept: SLEEP MEDICINE | Facility: MEDICAL CENTER | Age: 46
End: 2021-04-16
Payer: COMMERCIAL

## 2021-04-16 VITALS — WEIGHT: 182 LBS | BODY MASS INDEX: 26.05 KG/M2 | HEIGHT: 70 IN

## 2021-04-16 DIAGNOSIS — K58.9 IRRITABLE BOWEL SYNDROME, UNSPECIFIED TYPE: ICD-10-CM

## 2021-04-16 DIAGNOSIS — G47.33 OSA (OBSTRUCTIVE SLEEP APNEA): Chronic | ICD-10-CM

## 2021-04-16 PROCEDURE — 99213 OFFICE O/P EST LOW 20 MIN: CPT | Mod: 95,CR | Performed by: NURSE PRACTITIONER

## 2021-04-16 ASSESSMENT — FIBROSIS 4 INDEX: FIB4 SCORE: 0.49

## 2021-04-16 NOTE — PROGRESS NOTES
Virtual Visit: Established Patient   This visit was conducted via Zoom using secure and encrypted videoconferencing technology. The patient was in a private location in the state of [place].    The patient's identity was confirmed and verbal consent was obtained for this virtual visit.  Given the importance of social distancing and other strategies recommended to reduce the risk of COVID-19 transmission, I am providing medical care to this patient via audio/video visit in place of an in person visit at the request of the patient.  Subjective:   CC:   Chief Complaint   Patient presents with   • Follow-Up     LUIS FELIPE-Last seen 04/21/2020       Janette Swan is a 45 y.o. female presenting for evaluation and management of LUIS FELIPE, annual f/u. PMH includes IBS, LUIS FELIPE, PVCs, pure hypercholesterolemia, vitamin D deficiency, never smoker, tonsillectomy, turbinectomy, frequent headache, menopause, anxiety, bilateral carpal tunnel syndrome with neuropathy.    Compliance report from 1/6/21-2/4/21 was downloaded and reviewed with the patient which showed autoCPAP 5-10 cmH2O, 43.3% compliance, 4 hrs 38 min use (23.3% compliance), AHI of 1.0. She is tolerating the pressure and mask well, but has not used the CPAP because her DreamWear akila was not sinking with the CPAP.  She may also want to try and switch to a fullface mask because she is experiencing dryness of the mouth due to mouth breathing when she uses the nasal pillow mask.  She goes to bed at 10 pm and wakes up at 6:30 am.  Continues to report morning headache but is not sure about snoring.  She follows up with her GYN specialist for menopause and has been experiencing heart racing and heat flashes which wake her up at night.  She is on hormone replacement therapy.  She continues to follow-up with GI specialist for IBS and consumes a high-fiber diet but has noted bloating when she does not use the CPAP.  She is pending a colonoscopy this year.  She has been informed that she  grinds her teeth by her dentist.  She stays active by walking her beagle every day.  She has had a lot of anxiety and stress due to being laid off since the pandemic.    Sleep Study History:  OPO on autoCPAP 5-10 cmH2O from 2/2/18 indicated O2 Sat. pat was 79% and mean O2 sat was 90 % and baseline O2 at 92%. O2 sat was below 88% for 0.3 min of the flow evaluation time. Oxygen Desaturation (>=3%) Index was elevated at 2.6/hr.     HSS from 10/23/17 indicated mild obstructive sleep apnea with  Respiratory Event Index (SOFIYA) of 8.7 per hour. Apneas: 1 (Obstructive apnea index 0/hr, Central apnea index 0.1 /hr, mixed 0 /hour), Hypopneas: 62. O2 Sat. pat was 88% and mean O2 sat was 93% and baseline O2 at 94 %. O2 sat was below 90% for 1% of the flow evaluation time. Oxygen Desaturation (>=3%) Index was elevated at 9.2/hr.     ROS   Denies any recent fevers or chills. No nausea or vomiting. No chest pains or shortness of breath.     Allergies   Allergen Reactions   • Codeine Vomiting       Current medicines (including changes today)  Current Outpatient Medications   Medication Sig Dispense Refill   • metoprolol SR (TOPROL XL) 25 MG TABLET SR 24 HR Take 25 mg by mouth every day. For arrhyithmia     • MAGNESIUM PO Take  by mouth.     • vitamin D3, cholecalciferol, 1000 UNIT Tab Take 1,000 Units by mouth every day.     • metronidazole (METROCREAM) 0.75 % cream Apply 1 Application topically every day.     • Norethin-Eth Estrad-Fe Biphas (LO LOESTRIN FE PO) Take  by mouth.       No current facility-administered medications for this visit.       Patient Active Problem List    Diagnosis Date Noted   • Occult blood in stools 11/16/2020   • Chronic neck pain 11/16/2020   • Neck swelling 11/16/2020   • Bilateral carpal tunnel syndrome 11/16/2020   • Frequent PVCs 06/08/2018   • Abnormal mammogram of right breast 03/22/2018   • LUIS FELIPE (obstructive sleep apnea) 10/30/2017   • Vitamin D deficiency disease 08/27/2015   • Pure  "hypercholesterolemia 08/27/2015   • Irritable bowel syndrome (IBS) 04/25/2014       Family History   Problem Relation Age of Onset   • Hypertension Mother    • Hyperlipidemia Mother    • Heart Disease Mother 77        MI   • Sleep Apnea Mother    • Hypertension Father    • Hyperlipidemia Father    • Other Father         Alzheimers   • Sleep Apnea Father    • Diabetes Other        She  has a past medical history of Advance directive in chart (08/15/02), Frequent headaches, History of intestinal parasite (1994), History of pneumonia (12/11), IBS (irritable bowel syndrome), Morning headache, and Nephrolithiasis (4/24/2014).  She  has a past surgical history that includes tonsillectomy (1995) and turbinectomy.       Objective:   Ht 1.778 m (5' 10\")   Wt 82.6 kg (182 lb)   BMI 26.11 kg/m²     Physical Exam:  Constitutional: Alert, no distress, well-groomed.  Skin: No rashes in visible areas.  Eye: Round. Conjunctiva clear, lids normal. No icterus.   ENMT: Lips pink without lesions, good dentition, moist mucous membranes. Phonation normal.  Neck: Moves freely without pain.  Respiratory: Unlabored respiratory effort, no cough or audible wheeze  Psych: Alert and oriented x3, normal affect and mood.       Assessment and Plan:   The following treatment plan was discussed:     1. LUIS FELIPE (obstructive sleep apnea)  - DME Mask and Supplies    2. BMI 26.0-26.9,adult    3. Irritable bowel syndrome, unspecified type      Discussed the cardiovascular and neuropsychiatric risks of untreated LUIS FELIPE; including but not limited to: HTN, DM, MI, ASCVD, CVA, CHF, traffic accidents.     1. Compliance report from 1/6/21-2/4/21 was downloaded and reviewed with the patient which showed autoCPAP 5-10 cmH2O, 43.3% compliance, 4 hrs 38 min use (23.3% compliance), AHI of 1.0. Continue autoCPAP. Patient is compliant and benefiting from autoCPAP therapy for management of LUIS FELIPE. Advised patient to use the CPAP every night for more than four hours for " optimal health benefit as the machine stores a year worth of compliance data.     DME order ([facility]) for mask (MOC) and supplies was provided today. Continue to clean mask and supplies weekly, and change supplies per insurance guidelines.  Advised patient to consider switching to a fullface mask to avoid mask leak and dry mouth.  Currently she is using a nasal pillow mask.    2.  Encouraged a healthy diet and exercise to help with weight management.  3. IBS: Continue to follow-up with GI specialist and advised patient to further review bloating with her specialist.  Advised patient when bloating is related to CPAP use to elevate head of the bed and she may also try over-the-counter Gas-X or Beano but again to review this over-the-counter medication with GI specialist.  4. Follow up with the appropriate healthcare practitioners for all other medical problems and issues.  Advised patient to further review anxiety with PCP.  Continue to follow-up with GYN specialist for menopause currently on hormone replacement therapy.  5. Sleep hygiene discussed. Recommend keeping a set sleep/wake schedule. Logging enough hours of sleep. Limiting/Avoiding naps. No caffeine after noon and no heavy meals in the evening.         Follow-up: Return in about 3 months (around 7/16/2021).    TELMA Alford.    This dictation was created using voice recognition software. The accuracy of the dictation is limited to the abilities of the software. I expect there may be some errors of grammar and possibly content.

## 2021-04-19 ENCOUNTER — PHYSICAL THERAPY (OUTPATIENT)
Dept: PHYSICAL THERAPY | Facility: MEDICAL CENTER | Age: 46
End: 2021-04-19
Attending: PHYSICAL MEDICINE & REHABILITATION
Payer: COMMERCIAL

## 2021-04-19 DIAGNOSIS — R20.2 PARESTHESIA: ICD-10-CM

## 2021-04-19 DIAGNOSIS — M54.2 CERVICALGIA: ICD-10-CM

## 2021-04-19 PROCEDURE — 97110 THERAPEUTIC EXERCISES: CPT

## 2021-04-19 PROCEDURE — 97012 MECHANICAL TRACTION THERAPY: CPT

## 2021-04-19 ASSESSMENT — ENCOUNTER SYMPTOMS
QUALITY: TINGLING
POSTURAL HEADACHE: 1
ALLEVIATING FACTORS: TIME
QUALITY: NUMBNESS
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
## 2021-04-22 ENCOUNTER — PATIENT MESSAGE (OUTPATIENT)
Dept: SLEEP MEDICINE | Facility: MEDICAL CENTER | Age: 46
End: 2021-04-22

## 2021-04-22 ENCOUNTER — APPOINTMENT (OUTPATIENT)
Dept: PHYSICAL THERAPY | Facility: MEDICAL CENTER | Age: 46
End: 2021-04-22
Attending: PHYSICAL MEDICINE & REHABILITATION
Payer: COMMERCIAL

## 2021-04-23 ENCOUNTER — APPOINTMENT (RX ONLY)
Dept: URBAN - METROPOLITAN AREA CLINIC 36 | Facility: CLINIC | Age: 46
Setting detail: DERMATOLOGY
End: 2021-04-23

## 2021-04-23 DIAGNOSIS — Z41.9 ENCOUNTER FOR PROCEDURE FOR PURPOSES OTHER THAN REMEDYING HEALTH STATE, UNSPECIFIED: ICD-10-CM

## 2021-04-23 PROCEDURE — ? BOTOX

## 2021-04-23 NOTE — PROCEDURE: BOTOX
Mentalis Units: 0
Dilution (U/0.1 Cc): 0.6
Show Additional Area 1: Yes
Detail Level: Detailed
Additional Area 3 Location: masseter
Show Right And Left Periorbital Units: No
Additional Area 2 Location: chin
Post-Care Instructions: Patient instructed to not lie down for 4 hours and limit physical activity for 24 hours. Patient instructed not to travel by airplane for 48 hours.
Periorbital Skin Units: 15
Price (Use Numbers Only, No Special Characters Or $): 0.00
Glabellar Complex Units: 20
Expiration Date (Month Year): 08/2023
Additional Area 1 Location: upper lip
Consent: Written consent obtained. Risks include but not limited to lid/brow ptosis, bruising, swelling, diplopia, temporary effect, incomplete chemical denervation.
Lot #: [id number]

## 2021-04-26 ENCOUNTER — HOSPITAL ENCOUNTER (OUTPATIENT)
Dept: RADIOLOGY | Facility: MEDICAL CENTER | Age: 46
End: 2021-04-26
Attending: PHYSICIAN ASSISTANT
Payer: COMMERCIAL

## 2021-04-26 DIAGNOSIS — Z12.31 VISIT FOR SCREENING MAMMOGRAM: ICD-10-CM

## 2021-04-26 PROCEDURE — 77063 BREAST TOMOSYNTHESIS BI: CPT

## 2021-04-27 ENCOUNTER — APPOINTMENT (OUTPATIENT)
Dept: PHYSICAL THERAPY | Facility: MEDICAL CENTER | Age: 46
End: 2021-04-27
Attending: PHYSICAL MEDICINE & REHABILITATION
Payer: COMMERCIAL

## 2021-04-29 ENCOUNTER — APPOINTMENT (OUTPATIENT)
Dept: PHYSICAL THERAPY | Facility: MEDICAL CENTER | Age: 46
End: 2021-04-29
Attending: PHYSICAL MEDICINE & REHABILITATION
Payer: COMMERCIAL

## 2021-05-03 ENCOUNTER — APPOINTMENT (OUTPATIENT)
Dept: PHYSICAL THERAPY | Facility: MEDICAL CENTER | Age: 46
End: 2021-05-03
Payer: COMMERCIAL

## 2021-05-05 ENCOUNTER — APPOINTMENT (OUTPATIENT)
Dept: PHYSICAL THERAPY | Facility: MEDICAL CENTER | Age: 46
End: 2021-05-05
Payer: COMMERCIAL

## 2021-05-20 ENCOUNTER — TELEPHONE (OUTPATIENT)
Dept: PHYSICAL THERAPY | Facility: MEDICAL CENTER | Age: 46
End: 2021-05-20

## 2021-05-20 NOTE — OP THERAPY DISCHARGE SUMMARY
Outpatient Physical Therapy  DISCHARGE SUMMARY NOTE      [facility] Outpatient Physical Therapy  [address]  [place] [address]  Phone:  [phone]  Fax:  [phone]    Date of Visit: 05/20/2021    Patient: Janette Swan  YOB: 1975  MRN: [id number]     Referring Provider: No referring provider defined for this encounter.   Referring Diagnosis No admission diagnoses are documented for this encounter.         Functional Assessment Used        Your patient is being discharged from Physical Therapy with the following comments:   · Goals not met    Comments:  Patient only seen for initial evaluation. Patient was going to explore other PT offices due to cost of care at hospital based clinic.        Recommendations:  Patient cancelled follow ups at this location. D/C from PT at this time.     Suki Rodriguez, PT, DPT    Date: 5/20/2021

## 2021-06-03 ENCOUNTER — NON-PROVIDER VISIT (OUTPATIENT)
Dept: NEUROLOGY | Facility: MEDICAL CENTER | Age: 46
End: 2021-06-03
Attending: FAMILY MEDICINE
Payer: COMMERCIAL

## 2021-06-03 DIAGNOSIS — R20.2 PARESTHESIAS: ICD-10-CM

## 2021-06-03 DIAGNOSIS — M54.2 CERVICALGIA: ICD-10-CM

## 2021-06-03 PROCEDURE — 95886 MUSC TEST DONE W/N TEST COMP: CPT | Performed by: PHYSICAL MEDICINE & REHABILITATION

## 2021-06-03 PROCEDURE — 95912 NRV CNDJ TEST 11-12 STUDIES: CPT | Mod: 26 | Performed by: PHYSICAL MEDICINE & REHABILITATION

## 2021-06-03 PROCEDURE — 95886 MUSC TEST DONE W/N TEST COMP: CPT | Mod: 26 | Performed by: PHYSICAL MEDICINE & REHABILITATION

## 2021-06-03 PROCEDURE — 95912 NRV CNDJ TEST 11-12 STUDIES: CPT | Performed by: PHYSICAL MEDICINE & REHABILITATION

## 2021-06-03 NOTE — PROCEDURES
"    [facility]  Neurodiagnostic Laboratory  [address], [address]  [place] [address]  Tel: [phone]  Fax: [phone]  Test Date:  6/3/2021    Patient: Janette Swan : 1975 Physician: Dr. An   Sex: Female Height: 5' 10\" Ref Phys: Dr. An   ID#: [id number] Weight: 185 lbs. Technician: Emily Blackmon    CSN#:          Chief Complaint: Neck and upper extremity paresthesias      HPI:    The patient presents for electrodiagnostic evaluation of bilateral upper extremities.  See note from 2021 for further details.  She has had some improvement with physical therapy and reports that she has had reduction in paresthesias that were previously noted.    Exam: unchanged from 2021      Summary of findings:  Sensory comparison studies involving the left and right median/radial D1, median and ulnar D4 studies are within normal limits.  Motor studies of the left and right median and ulnar motor studies are within normal limits.  No abnormalities were noted in bilateral upper extremity needle examination. See table for details.    Impression/Recommendations:    Normal study    1. Today's electrodiagnostic findings point against:  a.Right or left median mononeuropathy at the wrist(e.g. carpal tunnel syndrome(CTS).  b.Right or left  ulnar neuropathy  c.Right or left cervical radiculopathy    2. Clinically, today's electrodiagnostic findings do not demonstrate abnormalities to account for her symptoms.  Encouraged her to work on home exercise program.  Return if symptoms progress.    Thank you very much for asking me to participate in Janette Swan's care.  Please contact me with any questions or concerns.    Carlos An MD  Physical Medicine and Rehabilitation  Interventional Spine and Sports Physiatry  [facility]            Nerve Conduction Studies     Stim Site NR Onset (ms) Norm Onset (ms) O-P Amp (mV) Norm O-P Amp Site1 Site2 Delta-0 (ms) Dist (cm) Dudley (m/s) Norm " Dudley (m/s)   Left Median Motor (Abd Poll Brev)   Wrist    2.5 <3.9 10.3 >6 Elbow Wrist 4.4 26.0 59 >50   Elbow    6.9  10.2          Right Median Motor (Abd Poll Brev)   Wrist    2.4 <3.9 12.8 >6 Elbow Wrist 4.4 28.0 64 >50   Elbow    6.8  12.0          Left Ulnar Motor (Abd Dig Min)   Wrist    2.7 <3.1 10.2 >7 B Elbow Wrist 2.8 18.0 64 >50   B Elbow    5.5  9.8  A Elbow B Elbow 1.5 10.0 67    A Elbow    7.0  9.5          Right Ulnar Motor (Abd Dig Min)   Wrist    2.6 <3.1 8.9 >7 B Elbow Wrist 3.0 19.0 63 >50   B Elbow    5.6  8.6  A Elbow B Elbow 1.3 10.0 77    A Elbow    6.9  8.2               Stim Site NR Peak (ms) Norm Peak (ms) P-T Amp (µV) Site1 Site2 Delta-P (ms) Norm Delta (ms)   Left Median/Radial Dig I Comparison (Digit 1 - 10cm)   Median    2.3 <2.9 42.5 Median Radial 0.1 <0.4   Radial    2.4 <2.8 73.0       Right Median/Radial Dig I Comparison (Digit 1 - 10cm)   Median    2.5 <2.9 27.2 Median Radial 0.2 <0.4   Radial    2.3 <2.8 43.3       Left Median/Ulnar Dig IV Comparison (Wrist - 14 cm)   Median wrist    3.0 <3.3 49.8 Median wrist Ulnar wrist 0.2 <0.4   Ulnar wrist    3.2 <3.3 29.6       Right Median/Ulnar Dig IV Comparison (Wrist - 14 cm)   Median wrist    2.9 <3.3 34.0 Median wrist Ulnar wrist 0.2 <0.4   Ulnar wrist    3.1 <3.3 22.2         F Wave Studies     NR F-Lat (ms) Lat Norm (ms)   Left Median (Abd Poll Brev)      24.41 <31   Right Median (Abd Poll Brev)      25.43 <31   Left Ulnar (Abd Dig Min)      25.26 <32   Right Ulnar (Abd Dig Min)      26.00 <32                                     Electromyography     Side Muscle Nerve Root Ins Act Fibs Psw Amp Dur Poly Recrt Int Pat Comment   Left Deltoid Axillary C5-6 Nml Nml Nml Nml Nml 0 Nml Nml    Left Biceps Musculocut C5-6 Nml Nml Nml Nml Nml 0 Nml Nml    Left Triceps Radial C6-7-8 Nml Nml Nml Nml Nml 0 Nml Nml    Left Abd Poll Brev Median C8-T1 Nml Nml Nml Nml Nml 0 Nml Nml    Left Ext Digitorum Radial (Post Int) C7-8 Nml Nml Nml Nml Nml 0 Nml  Nml    Left 1stDorInt Ulnar C8-T1 Nml Nml Nml Nml Nml 0 Nml Nml    Left PronatorTeres Median C6-7 Nml Nml Nml Nml Nml 0 Nml Nml    Left FlexCarpiUln Ulnar C8,T1 Nml Nml Nml Nml Nml 0 Nml Nml    Right Deltoid Axillary C5-6 Nml Nml Nml Nml Nml 0 Nml Nml    Right Biceps Musculocut C5-6 Nml Nml Nml Nml Nml 0 Nml Nml    Right Triceps Radial C6-7-8 Nml Nml Nml Nml Nml 0 Nml Nml    Right Abd Poll Brev Median C8-T1 Nml Nml Nml Nml Nml 0 Nml Nml    Right 1stDorInt Ulnar C8-T1 Nml Nml Nml Nml Nml 0 Nml Nml    Right PronatorTeres Median C6-7 Nml Nml Nml Nml Nml 0 Nml Nml    Right FlexCarpiUln Ulnar C8,T1 Nml Nml Nml Nml Nml 0 Nml Nml    Right Ext Digitorum Radial (Post Int) C7-8 Nml Nml Nml Nml Nml 0 Nml Nml

## 2021-07-30 ENCOUNTER — TELEMEDICINE (OUTPATIENT)
Dept: SLEEP MEDICINE | Facility: MEDICAL CENTER | Age: 46
End: 2021-07-30
Payer: COMMERCIAL

## 2021-07-30 VITALS — BODY MASS INDEX: 27.2 KG/M2 | HEIGHT: 70 IN | WEIGHT: 190 LBS

## 2021-07-30 DIAGNOSIS — I49.3 FREQUENT PVCS: ICD-10-CM

## 2021-07-30 DIAGNOSIS — G47.33 OSA (OBSTRUCTIVE SLEEP APNEA): Chronic | ICD-10-CM

## 2021-07-30 PROCEDURE — 99213 OFFICE O/P EST LOW 20 MIN: CPT | Mod: 95 | Performed by: NURSE PRACTITIONER

## 2021-07-30 ASSESSMENT — FIBROSIS 4 INDEX: FIB4 SCORE: 0.5

## 2021-07-30 NOTE — PROGRESS NOTES
Virtual Visit: Established Patient   This visit was conducted via Zoom using secure and encrypted videoconferencing technology. The patient was in a private location in the state of [place].    The patient's identity was confirmed and verbal consent was obtained for this virtual visit.  Given the importance of social distancing and other strategies recommended to reduce the risk of COVID-19 transmission, I am providing medical care to this patient via audio/video visit in place of an in person visit at the request of the patient.  Subjective:   CC:   Chief Complaint   Patient presents with   • Follow-Up     LUIS FELIPE-Last seen 04/16/2021       Janette Swan is a 46 y.o. female presenting for evaluation and management of LUIS FELIPE. PMH includes IBS, LUIS FELIPE, PVCs, pure hypercholesterolemia, vitamin D deficiency, never smoker, tonsillectomy, turbinectomy, frequent headache, menopause, anxiety, bilateral carpal tunnel syndrome with neuropathy.    Patient was set up with a Peter Resp[employer]ics auto CPAP on 12/21/17.  She has not noted any issues with the machine nor experiencing any symptoms from its use.  Patient plans to continue using the CPAP.  Compliance report from 6/30/21-7/29/21 was downloaded and reviewed with the patient which showed autoCPAP 5-15 cmH2O, 70% compliance, 5 hrs 26 min use (56.7% compliance), AHI of 1.3.  She has a lower compliance this month due to being out of town and also having guests.  She is very diligent about using it nightly because it allows her to sleep better and feel more refreshed and she also wants to keep healthy.  She is tolerating the pressure and mask well.  She has decided to stick with the nasal pillow mask because she tolerates it pretty well and will also be getting a mouthguard to help with bruxism which should help her to keep her mouth closed.  She goes to bed at 10 pm and wakes up at 6:30 am. She continues to follow-up with GI specialist for IBS and consumes a high-fiber diet.  She  feels the bloating is stable.  She underwent a colonoscopy which revealed some polyps that her specialist would like to keep an eye on.  She stays active by walking her beagle every day.    She follows up with cardiology at [facility] for PVCs.      Sleep Study History:  OPO on autoCPAP 5-10 cmH2O from 2/2/18 indicated O2 Sat. pat was 79% and mean O2 sat was 90 % and baseline O2 at 92%. O2 sat was below 88% for 0.3 min of the flow evaluation time. Oxygen Desaturation (>=3%) Index was elevated at 2.6/hr.      HSS from 10/23/17 indicated mild obstructive sleep apnea with  Respiratory Event Index (SOFIYA) of 8.7 per hour. Apneas: 1 (Obstructive apnea index 0/hr, Central apnea index 0.1 /hr, mixed 0 /hour), Hypopneas: 62. O2 Sat. pat was 88% and mean O2 sat was 93% and baseline O2 at 94 %. O2 sat was below 90% for 1% of the flow evaluation time. Oxygen Desaturation (>=3%) Index was elevated at 9.2/hr.     ROS   Denies any recent fevers or chills. No nausea or vomiting. No chest pains or shortness of breath.     Allergies   Allergen Reactions   • Codeine Vomiting       Current medicines (including changes today)  Current Outpatient Medications   Medication Sig Dispense Refill   • metoprolol SR (TOPROL XL) 25 MG TABLET SR 24 HR Take 25 mg by mouth every day. For arrhyithmia     • metronidazole (METROCREAM) 0.75 % cream Apply 1 Application topically every day.     • Norethin-Eth Estrad-Fe Biphas (LO LOESTRIN FE PO) Take  by mouth.     • MAGNESIUM PO Take  by mouth.     • vitamin D3, cholecalciferol, 1000 UNIT Tab Take 1,000 Units by mouth every day.       No current facility-administered medications for this visit.       Patient Active Problem List    Diagnosis Date Noted   • Occult blood in stools 11/16/2020   • Chronic neck pain 11/16/2020   • Neck swelling 11/16/2020   • Bilateral carpal tunnel syndrome 11/16/2020   • Frequent PVCs 06/08/2018   • Abnormal mammogram of right breast 03/22/2018   • LUIS FELIPE (obstructive  "sleep apnea) 10/30/2017   • Vitamin D deficiency disease 08/27/2015   • Pure hypercholesterolemia 08/27/2015   • Irritable bowel syndrome (IBS) 04/25/2014       Family History   Problem Relation Age of Onset   • Hypertension Mother    • Hyperlipidemia Mother    • Heart Disease Mother 77        MI   • Sleep Apnea Mother    • Hypertension Father    • Hyperlipidemia Father    • Other Father         Alzheimers   • Sleep Apnea Father    • Diabetes Other        She  has a past medical history of Advance directive in chart (08/15/02), Frequent headaches, History of intestinal parasite (1994), History of pneumonia (12/11), IBS (irritable bowel syndrome), Morning headache, and Nephrolithiasis (4/24/2014).  She  has a past surgical history that includes tonsillectomy (1995) and turbinectomy.       Objective:   Ht 1.778 m (5' 10\")   Wt 86.2 kg (190 lb)   BMI 27.26 kg/m²     Physical Exam:  Constitutional: Alert, no distress, well-groomed.  Skin: No rashes in visible areas.  Eye: Round. Conjunctiva clear, lids normal. No icterus.   ENMT: Lips pink without lesions, good dentition, moist mucous membranes. Phonation normal.  Neck: Moves freely without pain.  Respiratory: Unlabored respiratory effort, no cough or audible wheeze  Psych: Alert and oriented x3, normal affect and mood.       Assessment and Plan:   The following treatment plan was discussed:     1. LUIS FELIPE (obstructive sleep apnea)    2. Frequent PVCs    3. BMI 27.0-27.9,adult      Discussed the cardiovascular and neuropsychiatric risks of untreated LUIS FELPIE; including but not limited to: HTN, DM, MI, ASCVD, CVA, CHF, traffic accidents.     1. Compliance report from 6/30/21-7/29/21 was downloaded and reviewed with the patient which showed autoCPAP 5-15 cmH2O, 70% compliance, 5 hrs 26 min use (56.7% compliance), AHI of 1.3. Patient is compliant and benefiting from autoCPAP therapy for management of LUIS FELIPE.     *DME order ([facility]) for mask (nasal pillow mask or MOC) and supplies was " not needed today. Continue to clean mask and supplies weekly, and change supplies per insurance guidelines.     *Recall of [employer] CPAP machines was reviewed with the patient today. What I have been recommending to the patients is to call their supplying companies to figure out exact model of their machine. You can self register your machine on [employer] website listed below. We do not know if [employer] will be giving out replacement machines or just repairing it. This is all new for us to let you know how this is going to pan out. The recent recall from [employer] is due to disintegration of the polyurethane foam. This is a 0.03% risk of break down of an inner foam in device, which is a small risk. Currently I recommend to continue using your machine until or unless you experience black debris/particles within the air path or experiencing headaches, upper airway irritation, cough, chest pressure, sinus infection, irritation to skin/eyes/respiratory tract, inflammatory response, asthma, nausea/vomiting to stop using the PAP therapy immediately and contact the office. It is ultimately your decision on whether you choose to continue using the CPAP machine. Patient is also recommended to reach out to their DME to let them know that they are using [employer] machine, therefore when and if needed those machines can be replaced by their DME's. We will keep you posted as we get more information from the  for the DME.     [place] your device on the recall website at [email or web address]     2. Continue to f/u with cardiology at [facility].   3. Continue to stay active.   4. Sleep hygiene was reviewed. Recommend keeping a set sleep/wake schedule. Logging enough hours of sleep. Limiting/Avoiding naps. No caffeine after noon and no heavy meals in the evening.   5. Follow up with the appropriate healthcare practitioners for all other medical problems and issues.      Follow-up: Return in  about 6 months (around 1/30/2022) for TELMA Horta.    This dictation was created using voice recognition software. The accuracy of the dictation is limited to the abilities of the software. I expect there may be some errors of grammar and possibly content.

## 2022-01-20 ENCOUNTER — APPOINTMENT (RX ONLY)
Dept: URBAN - METROPOLITAN AREA CLINIC 4 | Facility: CLINIC | Age: 47
Setting detail: DERMATOLOGY
End: 2022-01-20

## 2022-01-20 DIAGNOSIS — D22 MELANOCYTIC NEVI: ICD-10-CM

## 2022-01-20 PROBLEM — D48.5 NEOPLASM OF UNCERTAIN BEHAVIOR OF SKIN: Status: ACTIVE | Noted: 2022-01-20

## 2022-01-20 PROCEDURE — 11102 TANGNTL BX SKIN SINGLE LES: CPT

## 2022-01-20 PROCEDURE — ? BIOPSY BY SHAVE METHOD

## 2022-01-20 PROCEDURE — 11103 TANGNTL BX SKIN EA SEP/ADDL: CPT

## 2022-01-20 ASSESSMENT — LOCATION SIMPLE DESCRIPTION DERM
LOCATION SIMPLE: ABDOMEN
LOCATION SIMPLE: CHIN

## 2022-01-20 ASSESSMENT — LOCATION DETAILED DESCRIPTION DERM
LOCATION DETAILED: RIGHT CHIN
LOCATION DETAILED: RIGHT RIB CAGE

## 2022-01-20 ASSESSMENT — LOCATION ZONE DERM
LOCATION ZONE: FACE
LOCATION ZONE: TRUNK

## 2022-03-02 ENCOUNTER — PATIENT MESSAGE (OUTPATIENT)
Dept: MEDICAL GROUP | Facility: MEDICAL CENTER | Age: 47
End: 2022-03-02
Payer: COMMERCIAL

## 2022-03-08 NOTE — PROGRESS NOTES
Subjective:     CC: weight loss, HTN    HPI:   Janette presents today with     Weight loss- patient was last seen in our office on 11/16/2020 at which time her weight was 191lbs.  States she had lost 10lbs at home recently but realized that this was due to her eating about 7 servings of fruits and vegetables per day and not eating enough calories.  She has started eating a more calories and weight is increasing.      She is followed by GI re: IBS, pulmonary Re: LUIS FELIPE, Cardiology re: PVCs in [place].      HTN- She has home EKG measuring device recommended by cardiology.  States her last stress test and holter was 4 years ago.  States her blood pressures were up in the 160s recently after she missed her metoprolol and drinking significant amounts of alcohol and caffeine.  She is taking metoprolol for symptomatic PVCs and has been taking metoprolol 25mg, 1/2 tablet BID daily for 1 week and home Bps are reducing with most recent  Home Bps 147/106-150/90s.  Hrs have been in the 60-70s.  She is measuring her BP after walking around her home.  She has a follow-up appt with cardiology later this month due to having bigemity at home.  No chest pain, palpitations, or SOB currently.      Past Medical History:   Diagnosis Date   • Advance directive in chart 08/15/02    NO BLOOD PRODUCTS   • Frequent headaches    • History of intestinal parasite 1994    after [place] trip   • History of pneumonia 12/11   • IBS (irritable bowel syndrome)    • Morning headache    • Nephrolithiasis 4/24/2014       Social History     Tobacco Use   • Smoking status: Never Smoker   • Smokeless tobacco: Never Used   Vaping Use   • Vaping Use: Never used   Substance Use Topics   • Alcohol use: Yes     Alcohol/week: 0.6 oz     Types: 1 Standard drinks or equivalent per week   • Drug use: Yes     Types: Marijuana, Oral     Comment: occ       Current Outpatient Medications Ordered in Epic   Medication Sig Dispense Refill   • metoprolol tartrate (LOPRESSOR)  "25 MG Tab Take 0.5 Tablets by mouth 2 times a day. 90 Tablet 0   • LO LOESTRIN FE 1 MG-10 MCG / 10 MCG Tab Take 1 Tablet by mouth every day.     • Cholecalciferol 2000 UNIT Cap Take 1 Capsule by mouth every day.     • MAGNESIUM PO Take  by mouth.       No current Epic-ordered facility-administered medications on file.       Allergies:  Codeine    Health Maintenance: mammogram ordered    ROS:  Gen: no fevers/chills, no changes in weight  Eyes: no changes in vision  ENT: no sore throat, no hearing loss, no bloody nose  Pulm: no sob, no cough  CV: no chest pain, no palpitations  GI: no nausea/vomiting, no diarrhea  : no dysuria  MSk: no myalgias  Skin: no rash  Neuro: no headaches, no numbness/tingling  Heme/Lymph: no easy bruising      Objective:       Exam:  /80 (BP Location: Left arm, Patient Position: Sitting, BP Cuff Size: Adult)   Pulse 77   Temp 37 °C (98.6 °F) (Temporal)   Resp 12   Ht 1.778 m (5' 10\")   Wt 85.3 kg (188 lb)   SpO2 95%   BMI 26.98 kg/m²  Body mass index is 26.98 kg/m².    Gen: Alert and oriented, No apparent distress.  Neck: Neck is supple without lymphadenopathy.  Lungs: Normal effort, CTA bilaterally, no wheezes, rhonchi, or rales  CV: Regular rate and rhythm. No murmurs, rubs, or gallops.  Ext: No clubbing, cyanosis, edema.    Labs: 12/2020 labs reviewed    Assessment & Plan:     46 y.o. female with the following -     1. Healthcare maintenance  Doing well.  Vital signs normal today.  Follow-up labs ordered.  Mammogram ordered.  Patient to follow-up with her cardiologist in a couple weeks as scheduled and GI regarding her chronic IBS.  - CBC WITH DIFFERENTIAL; Future  - Comp Metabolic Panel; Future  - Lipid Profile; Future  - HEMOGLOBIN A1C; Future  - TSH WITH REFLEX TO FT4; Future  - VITAMIN D,25 HYDROXY; Future    2. Pure hypercholesterolemia  Chronic, unclear control.  We will recheck labs and consider statin based on results  - Lipid Profile; Future    3. Vitamin D " deficiency disease  Chronic, unclear control.  Will check labs and redose her supplement if needed  - VITAMIN D,25 HYDROXY; Future    4. Hyperglycemia  Chronic, unclear control.  Most recent fasting sugars were good but she does have elevated A1c in the past.  We will recheck labs.  Encourage low glycemic diet and exercise  - CBC WITH DIFFERENTIAL; Future  - HEMOGLOBIN A1C; Future    5. Frequent PVCs  Chronic issue, recently worsened after she stopped her metoprolol and drink alcohol and caffeine.  Symptoms are improving with restarting her metoprolol and avoiding caffeine and alcohol.  Will check labs and continue metoprolol at current dose with home blood pressure and heart rate monitoring.  Patient to follow-up with cardiology as scheduled.  ER precautions given  - Comp Metabolic Panel; Future  - TSH WITH REFLEX TO FT4; Future  - metoprolol tartrate (LOPRESSOR) 25 MG Tab; Take 0.5 Tablets by mouth 2 times a day.  Dispense: 90 Tablet; Refill: 0    6. Encounter for screening mammogram for breast cancer  Asymptomatic.  Follow-up mammogram ordered  - MA-SCREENING MAMMO BILAT W/TOMOSYNTHESIS W/CAD; Future      Return in about 1 year (around 3/9/2023) for Annual.    Please note that this dictation was created using voice recognition software. I have made every reasonable attempt to correct obvious errors, but I expect that there are errors of grammar and possibly content that I did not discover before finalizing the note.

## 2022-03-09 ENCOUNTER — OFFICE VISIT (OUTPATIENT)
Dept: MEDICAL GROUP | Facility: MEDICAL CENTER | Age: 47
End: 2022-03-09
Payer: COMMERCIAL

## 2022-03-09 VITALS
RESPIRATION RATE: 12 BRPM | WEIGHT: 188 LBS | TEMPERATURE: 98.6 F | HEIGHT: 70 IN | HEART RATE: 77 BPM | SYSTOLIC BLOOD PRESSURE: 122 MMHG | OXYGEN SATURATION: 95 % | BODY MASS INDEX: 26.92 KG/M2 | DIASTOLIC BLOOD PRESSURE: 80 MMHG

## 2022-03-09 DIAGNOSIS — Z00.00 HEALTHCARE MAINTENANCE: ICD-10-CM

## 2022-03-09 DIAGNOSIS — Z12.31 ENCOUNTER FOR SCREENING MAMMOGRAM FOR BREAST CANCER: ICD-10-CM

## 2022-03-09 DIAGNOSIS — R73.9 HYPERGLYCEMIA: ICD-10-CM

## 2022-03-09 DIAGNOSIS — E55.9 VITAMIN D DEFICIENCY: ICD-10-CM

## 2022-03-09 DIAGNOSIS — I49.3 FREQUENT PVCS: Primary | ICD-10-CM

## 2022-03-09 DIAGNOSIS — E78.00 PURE HYPERCHOLESTEROLEMIA: ICD-10-CM

## 2022-03-09 PROBLEM — R00.2 PALPITATIONS: Status: ACTIVE | Noted: 2020-11-25

## 2022-03-09 PROCEDURE — 99214 OFFICE O/P EST MOD 30 MIN: CPT | Performed by: FAMILY MEDICINE

## 2022-03-09 RX ORDER — NORETHINDRONE ACETATE AND ETHINYL ESTRADIOL, ETHINYL ESTRADIOL AND FERROUS FUMARATE 1MG-10(24)
1 KIT ORAL DAILY
COMMUNITY
Start: 2021-12-13 | End: 2023-01-04

## 2022-03-09 ASSESSMENT — PATIENT HEALTH QUESTIONNAIRE - PHQ9: CLINICAL INTERPRETATION OF PHQ2 SCORE: 0

## 2022-03-09 ASSESSMENT — FIBROSIS 4 INDEX: FIB4 SCORE: 0.5

## 2022-04-11 DIAGNOSIS — Z12.31 ENCOUNTER FOR SCREENING MAMMOGRAM FOR BREAST CANCER: ICD-10-CM

## 2022-04-11 DIAGNOSIS — N63.20 LEFT BREAST MASS: ICD-10-CM

## 2022-04-11 NOTE — PROGRESS NOTES
Received noticed from [facility] department that she had called Janette to schedule her screening mammogram, but patient reported new onset left breast mass.  Mammogram has been changed to a diagnostic mammogram and I messaged Austin requesting she help patient schedule this imaging. -Dr. Korin Garcia

## 2022-04-13 ENCOUNTER — HOSPITAL ENCOUNTER (OUTPATIENT)
Dept: LAB | Facility: MEDICAL CENTER | Age: 47
End: 2022-04-13
Attending: FAMILY MEDICINE
Payer: COMMERCIAL

## 2022-04-13 DIAGNOSIS — R73.9 HYPERGLYCEMIA: ICD-10-CM

## 2022-04-13 DIAGNOSIS — I49.3 FREQUENT PVCS: ICD-10-CM

## 2022-04-13 DIAGNOSIS — E55.9 VITAMIN D DEFICIENCY: ICD-10-CM

## 2022-04-13 DIAGNOSIS — Z00.00 HEALTHCARE MAINTENANCE: ICD-10-CM

## 2022-04-13 DIAGNOSIS — E78.00 PURE HYPERCHOLESTEROLEMIA: ICD-10-CM

## 2022-04-13 LAB
25(OH)D3 SERPL-MCNC: 39 NG/ML (ref 30–100)
ALBUMIN SERPL BCP-MCNC: 4.4 G/DL (ref 3.2–4.9)
ALBUMIN/GLOB SERPL: 1.5 G/DL
ALP SERPL-CCNC: 42 U/L (ref 30–99)
ALT SERPL-CCNC: 21 U/L (ref 2–50)
ANION GAP SERPL CALC-SCNC: 12 MMOL/L (ref 7–16)
AST SERPL-CCNC: 20 U/L (ref 12–45)
BASOPHILS # BLD AUTO: 0.5 % (ref 0–1.8)
BASOPHILS # BLD: 0.04 K/UL (ref 0–0.12)
BILIRUB SERPL-MCNC: 0.2 MG/DL (ref 0.1–1.5)
BUN SERPL-MCNC: 8 MG/DL (ref 8–22)
CALCIUM SERPL-MCNC: 9.5 MG/DL (ref 8.5–10.5)
CHLORIDE SERPL-SCNC: 100 MMOL/L (ref 96–112)
CHOLEST SERPL-MCNC: 226 MG/DL (ref 100–199)
CO2 SERPL-SCNC: 24 MMOL/L (ref 20–33)
CREAT SERPL-MCNC: 0.81 MG/DL (ref 0.5–1.4)
EOSINOPHIL # BLD AUTO: 0.13 K/UL (ref 0–0.51)
EOSINOPHIL NFR BLD: 1.6 % (ref 0–6.9)
ERYTHROCYTE [DISTWIDTH] IN BLOOD BY AUTOMATED COUNT: 41.1 FL (ref 35.9–50)
EST. AVERAGE GLUCOSE BLD GHB EST-MCNC: 105 MG/DL
FASTING STATUS PATIENT QL REPORTED: NORMAL
GFR SERPLBLD CREATININE-BSD FMLA CKD-EPI: 89 ML/MIN/1.73 M 2
GLOBULIN SER CALC-MCNC: 3 G/DL (ref 1.9–3.5)
GLUCOSE SERPL-MCNC: 89 MG/DL (ref 65–99)
HBA1C MFR BLD: 5.3 % (ref 4–5.6)
HCT VFR BLD AUTO: 42.1 % (ref 37–47)
HDLC SERPL-MCNC: 62 MG/DL
HGB BLD-MCNC: 13.9 G/DL (ref 12–16)
IMM GRANULOCYTES # BLD AUTO: 0.02 K/UL (ref 0–0.11)
IMM GRANULOCYTES NFR BLD AUTO: 0.3 % (ref 0–0.9)
LDLC SERPL CALC-MCNC: 142 MG/DL
LYMPHOCYTES # BLD AUTO: 3.78 K/UL (ref 1–4.8)
LYMPHOCYTES NFR BLD: 47.4 % (ref 22–41)
MCH RBC QN AUTO: 28.9 PG (ref 27–33)
MCHC RBC AUTO-ENTMCNC: 33 G/DL (ref 33.6–35)
MCV RBC AUTO: 87.5 FL (ref 81.4–97.8)
MONOCYTES # BLD AUTO: 0.45 K/UL (ref 0–0.85)
MONOCYTES NFR BLD AUTO: 5.6 % (ref 0–13.4)
NEUTROPHILS # BLD AUTO: 3.56 K/UL (ref 2–7.15)
NEUTROPHILS NFR BLD: 44.6 % (ref 44–72)
NRBC # BLD AUTO: 0 K/UL
NRBC BLD-RTO: 0 /100 WBC
PLATELET # BLD AUTO: 345 K/UL (ref 164–446)
PMV BLD AUTO: 9 FL (ref 9–12.9)
POTASSIUM SERPL-SCNC: 4.4 MMOL/L (ref 3.6–5.5)
PROT SERPL-MCNC: 7.4 G/DL (ref 6–8.2)
RBC # BLD AUTO: 4.81 M/UL (ref 4.2–5.4)
SODIUM SERPL-SCNC: 136 MMOL/L (ref 135–145)
TRIGL SERPL-MCNC: 111 MG/DL (ref 0–149)
TSH SERPL DL<=0.005 MIU/L-ACNC: 2.61 UIU/ML (ref 0.38–5.33)
WBC # BLD AUTO: 8 K/UL (ref 4.8–10.8)

## 2022-04-13 PROCEDURE — 80061 LIPID PANEL: CPT

## 2022-04-13 PROCEDURE — 83036 HEMOGLOBIN GLYCOSYLATED A1C: CPT

## 2022-04-13 PROCEDURE — 82306 VITAMIN D 25 HYDROXY: CPT

## 2022-04-13 PROCEDURE — 80053 COMPREHEN METABOLIC PANEL: CPT

## 2022-04-13 PROCEDURE — 36415 COLL VENOUS BLD VENIPUNCTURE: CPT

## 2022-04-13 PROCEDURE — 84443 ASSAY THYROID STIM HORMONE: CPT

## 2022-04-13 PROCEDURE — 85025 COMPLETE CBC W/AUTO DIFF WBC: CPT

## 2022-05-04 ENCOUNTER — HOSPITAL ENCOUNTER (OUTPATIENT)
Dept: RADIOLOGY | Facility: MEDICAL CENTER | Age: 47
End: 2022-05-04
Attending: FAMILY MEDICINE
Payer: COMMERCIAL

## 2022-05-04 DIAGNOSIS — N63.20 LEFT BREAST MASS: ICD-10-CM

## 2022-05-04 DIAGNOSIS — Z12.31 ENCOUNTER FOR SCREENING MAMMOGRAM FOR BREAST CANCER: ICD-10-CM

## 2022-05-04 PROCEDURE — 76642 ULTRASOUND BREAST LIMITED: CPT | Mod: LT

## 2022-05-04 PROCEDURE — G0279 TOMOSYNTHESIS, MAMMO: HCPCS

## 2022-06-12 NOTE — PROGRESS NOTES
Subjective:     CC: left lower back pain    HPI:   Janette presents today with     This is a new condition.  Onset: 4 weeks ago  Location: left lower back and radiates down her left hip. No CVA pain  Duration: intermittent.  Worse in the AM upon awakening.  Quality: sharp and dull.  More sharp in the AM, then dull ache after stretching  Modifying factors: worse when she tilts her hips forward when driving.  Improves with stretching and movement.  Precipitating trauma: none, but did a lot of driving prior to onset of symptoms.  Associated symptoms: No weakness or numbness except tingling of her 4th and 5th toes on her left foot intermittently which started about 1 week ago after massage.  She is able to control her urine and stool per baseline.  Home treatments: chiropractic adjustments are providing some improvement.  She has a massage scheduled. She would like PT.    No pain with urination or blood in urine.  No fevers, chills, weight loss.  She is using her spa for heat and rare tylenol use which do not provide significant relief.    Stress- She is seeing her therapist once monthly and feels this is helping her with her worries regarding stock market, war in [facility], school shootings.  She is going outside, socializing with friends and exercising to help optimize her mood.    Past Medical History:   Diagnosis Date   • Advance directive in chart 08/15/02    NO BLOOD PRODUCTS   • Frequent headaches    • History of intestinal parasite 1994    after [place] trip   • History of pneumonia 12/11   • IBS (irritable bowel syndrome)    • Morning headache    • Nephrolithiasis 4/24/2014       Social History     Tobacco Use   • Smoking status: Never Smoker   • Smokeless tobacco: Never Used   Vaping Use   • Vaping Use: Never used   Substance Use Topics   • Alcohol use: Yes     Alcohol/week: 0.6 oz     Types: 1 Standard drinks or equivalent per week   • Drug use: Yes     Types: Marijuana, Oral     Comment: occ       Current  "Outpatient Medications Ordered in Epic   Medication Sig Dispense Refill   • LO LOESTRIN FE 1 MG-10 MCG / 10 MCG Tab Take 1 Tablet by mouth every day.     • Cholecalciferol 2000 UNIT Cap Take 1 Capsule by mouth every day.     • metoprolol tartrate (LOPRESSOR) 25 MG Tab Take 0.5 Tablets by mouth 2 times a day. 90 Tablet 0   • MAGNESIUM PO Take  by mouth.       No current Epic-ordered facility-administered medications on file.       Allergies:  Codeine    Health Maintenance: Completed    ROS:  Gen: no fevers/chills, no changes in weight  Eyes: no changes in vision  ENT: no sore throat, no hearing loss, no bloody nose  Pulm: no sob, no cough  CV: no chest pain, no palpitations unless she drinks alcohol  GI: no nausea/vomiting, no diarrhea      Objective:       Exam:  /62 (BP Location: Left arm, Patient Position: Sitting, BP Cuff Size: Adult)   Pulse 80   Temp 37.1 °C (98.7 °F) (Temporal)   Resp 12   Ht 1.778 m (5' 10\")   Wt 87 kg (191 lb 12.8 oz)   SpO2 96%   BMI 27.52 kg/m²  Body mass index is 27.52 kg/m².    Gen: Alert and oriented, No apparent distress.  Neck: Neck is supple without lymphadenopathy.  Lungs: Normal effort, CTA bilaterally, no wheezes, rhonchi, or rales  CV: Regular rate and rhythm. No murmurs, rubs, or gallops.  Ext: No clubbing, cyanosis, edema.  Back: Full ROM, 5/5 LE strength, sensation intact bilaterally in LE, no TTP over spinous processes, paraspinals nontender, SI joint TTP on left, straight leg raise negative    Assessment & Plan:     46 y.o. female with the following -     1. Chronic left-sided low back pain without sciatica  Acute, improving but not resolved.  Continue chiropractic, massage, warm versus cold packs.  Patient declines oral medications.  Recommended over-the-counter Biofreeze versus Salonpas.  PT referral given.  Follow-up precautions given  - Referral to Physical Therapy    2. Frequent PVCs  Chronic issue, overall well controlled with metoprolol 12.5 mg twice " daily.  She has occasional lightheadedness when standing up quickly and getting up from downward dog position.  She is already on very low-dose metoprolol and given that she reports having palpitations after drinking alcohol, I am hesitant to reduce her metoprolol dose.  Patient to follow-up with cardiology for medication monitoring and titration      Return if symptoms worsen or fail to improve.    Please note that this dictation was created using voice recognition software. I have made every reasonable attempt to correct obvious errors, but I expect that there are errors of grammar and possibly content that I did not discover before finalizing the note.

## 2022-06-13 ENCOUNTER — OFFICE VISIT (OUTPATIENT)
Dept: MEDICAL GROUP | Facility: MEDICAL CENTER | Age: 47
End: 2022-06-13
Payer: COMMERCIAL

## 2022-06-13 VITALS
HEIGHT: 70 IN | RESPIRATION RATE: 12 BRPM | TEMPERATURE: 98.7 F | DIASTOLIC BLOOD PRESSURE: 62 MMHG | HEART RATE: 80 BPM | WEIGHT: 191.8 LBS | OXYGEN SATURATION: 96 % | BODY MASS INDEX: 27.46 KG/M2 | SYSTOLIC BLOOD PRESSURE: 116 MMHG

## 2022-06-13 DIAGNOSIS — G89.29 CHRONIC LEFT-SIDED LOW BACK PAIN WITHOUT SCIATICA: ICD-10-CM

## 2022-06-13 DIAGNOSIS — M54.50 CHRONIC LEFT-SIDED LOW BACK PAIN WITHOUT SCIATICA: ICD-10-CM

## 2022-06-13 DIAGNOSIS — I49.3 FREQUENT PVCS: ICD-10-CM

## 2022-06-13 PROCEDURE — 99213 OFFICE O/P EST LOW 20 MIN: CPT | Performed by: FAMILY MEDICINE

## 2022-06-13 ASSESSMENT — FIBROSIS 4 INDEX: FIB4 SCORE: 0.61

## 2022-06-21 ENCOUNTER — HOSPITAL ENCOUNTER (OUTPATIENT)
Dept: LAB | Facility: MEDICAL CENTER | Age: 47
End: 2022-06-21
Attending: PHYSICIAN ASSISTANT
Payer: COMMERCIAL

## 2022-06-21 PROCEDURE — 88175 CYTOPATH C/V AUTO FLUID REDO: CPT

## 2022-06-21 PROCEDURE — 87624 HPV HI-RISK TYP POOLED RSLT: CPT

## 2022-06-22 LAB
CYTOLOGY REG CYTOL: ABNORMAL
HPV HR 12 DNA CVX QL NAA+PROBE: POSITIVE
HPV16 DNA SPEC QL NAA+PROBE: NEGATIVE
HPV18 DNA SPEC QL NAA+PROBE: NEGATIVE
SPECIMEN SOURCE: ABNORMAL

## 2022-08-02 ENCOUNTER — PATIENT MESSAGE (OUTPATIENT)
Dept: MEDICAL GROUP | Facility: MEDICAL CENTER | Age: 47
End: 2022-08-02
Payer: COMMERCIAL

## 2022-08-29 ENCOUNTER — OFFICE VISIT (OUTPATIENT)
Dept: MEDICAL GROUP | Facility: MEDICAL CENTER | Age: 47
End: 2022-08-29
Payer: COMMERCIAL

## 2022-08-29 VITALS
OXYGEN SATURATION: 94 % | HEIGHT: 70 IN | WEIGHT: 191.8 LBS | HEART RATE: 71 BPM | RESPIRATION RATE: 20 BRPM | SYSTOLIC BLOOD PRESSURE: 108 MMHG | DIASTOLIC BLOOD PRESSURE: 80 MMHG | BODY MASS INDEX: 27.46 KG/M2 | TEMPERATURE: 98.4 F

## 2022-08-29 DIAGNOSIS — M54.42 CHRONIC LEFT-SIDED LOW BACK PAIN WITH LEFT-SIDED SCIATICA: ICD-10-CM

## 2022-08-29 DIAGNOSIS — G89.29 CHRONIC LEFT-SIDED LOW BACK PAIN WITH LEFT-SIDED SCIATICA: ICD-10-CM

## 2022-08-29 DIAGNOSIS — M25.552 CHRONIC LEFT HIP PAIN: ICD-10-CM

## 2022-08-29 DIAGNOSIS — G89.29 CHRONIC LEFT HIP PAIN: ICD-10-CM

## 2022-08-29 PROBLEM — M54.40 CHRONIC LEFT-SIDED LOW BACK PAIN WITH SCIATICA: Status: ACTIVE | Noted: 2022-06-13

## 2022-08-29 PROCEDURE — 99214 OFFICE O/P EST MOD 30 MIN: CPT | Performed by: FAMILY MEDICINE

## 2022-08-29 ASSESSMENT — FIBROSIS 4 INDEX: FIB4 SCORE: 0.62

## 2022-08-29 NOTE — ASSESSMENT & PLAN NOTE
This is a chronic condition.  Onset: 4/2022  Location: left front of her hip, left lateral hip to her left lower back and left SI joint  Duration: constant with worsening with certain positions  Quality: shooting pain that radiates up her back to her feet and up her back that is sharp  Modifying factors: worse in the AM. Walking and exercise improve symptoms.  Precipitating trauma: none but did a lot of driving prior to onset of symptoms  Associated symptoms:  No weakness but has tingling in her left lateral thigh to her left calf and bottom of her foot.  Home treatments: she walks on the treadmill for 10 minutes in the AM in order to be able to sit down comfortably.  She has gone to PT 6 weeks, does home exercises, chiropractor, massage and done hydrotherapy with some improvement but still is having significant discomfort.  The takes aleve or advil 400mg prn but not together. Does not take more than 1 over 24 hours

## 2022-08-29 NOTE — PROGRESS NOTES
Subjective:     CC: PT follow-up, would like MRI of hip/back    HPI:   Janette presents today with     She temporarily stopped OCPs due to them making her breasts feel larger but plans to restart them.      Chronic left-sided low back pain with sciatica  This is a chronic condition.  Onset: 4/2022  Location: left front of her hip, left lateral hip to her left lower back and left SI joint  Duration: constant with worsening with certain positions  Quality: shooting pain that radiates up her back to her feet and up her back that is sharp  Modifying factors: worse in the AM. Walking and exercise improve symptoms.  Precipitating trauma: none but did a lot of driving prior to onset of symptoms  Associated symptoms:  No weakness but has tingling in her left lateral thigh to her left calf and bottom of her foot.  Home treatments: she walks on the treadmill for 10 minutes in the AM in order to be able to sit down comfortably.  She has gone to PT 6 weeks, does home exercises, chiropractor, massage and done hydrotherapy with some improvement but still is having significant discomfort.  The takes aleve or advil 400mg prn but not together. Does not take more than 1 over 24 hours      Past Medical History:   Diagnosis Date    Advance directive in chart 08/15/02    NO BLOOD PRODUCTS    Frequent headaches     History of intestinal parasite 1994    after [place] trip    History of pneumonia 12/11    IBS (irritable bowel syndrome)     Morning headache     Nephrolithiasis 4/24/2014       Social History     Tobacco Use    Smoking status: Never    Smokeless tobacco: Never   Vaping Use    Vaping Use: Never used   Substance Use Topics    Alcohol use: Yes     Alcohol/week: 0.6 oz     Types: 1 Standard drinks or equivalent per week    Drug use: Yes     Types: Marijuana, Oral     Comment: occ       Current Outpatient Medications Ordered in Epic   Medication Sig Dispense Refill    Cholecalciferol 2000 UNIT Cap Take 1 Capsule by mouth every day.   "    metoprolol tartrate (LOPRESSOR) 25 MG Tab Take 0.5 Tablets by mouth 2 times a day. 90 Tablet 0    MAGNESIUM PO Take  by mouth.      LO LOESTRIN FE 1 MG-10 MCG / 10 MCG Tab Take 1 Tablet by mouth every day. (Patient not taking: Reported on 8/29/2022)       No current [facility]-ordered facility-administered medications on file.       Allergies:  Codeine    ROS:  Gen: no fevers/chills, no changes in weight  Pulm: no sob, no cough  CV: no chest pain, no palpitations  GI: no nausea/vomiting, no diarrhea.  She is able to control urine and stools  : no dysuria  MSk: per hpi  Skin: no rash      Objective:       Exam:  /80 (BP Location: Left arm, Patient Position: Sitting, BP Cuff Size: Large adult)   Pulse 71   Temp 36.9 °C (98.4 °F) (Temporal)   Resp 20   Ht 1.778 m (5' 10\")   Wt 87 kg (191 lb 12.8 oz)   SpO2 94%   BMI 27.52 kg/m²  Body mass index is 27.52 kg/m².    Gen: Alert and oriented, No apparent distress.  Neck: Neck is supple without lymphadenopathy.  Lungs: Normal effort, CTA bilaterally, no wheezes, rhonchi, or rales  CV: Regular rate and rhythm. No murmurs, rubs, or gallops.  Ext: No clubbing, cyanosis, edema.  Back: Full ROM, 5/5 LE strength, sensation intact bilaterally in LE, no TTP over spinous processes, paraspinals mild left TTP, SI joint TTP on left, straight leg raise negative  Left hip: Full ROM, full strength, TTP mild anterior hip TTP      Assessment & Plan:     47 y.o. female with the following -     1. Chronic left-sided low back pain with left-sided sciatica  Chronic, persistent.  Continue advil vs aleve with food since these are providing temporary improvement.  Continue PT, warm vs cold packs, massage, chiropractic.  Xray ordered.  If normal, pt to get BMP with plan for MRI given persistent symptoms, lateral leg numbness intermittently.  If abnormal, will consider ortho referral. Patient expressed understanding and agreement with plan.  - DX-HIP-UNILATERAL-WITH PELVIS-1 VIEW LEFT; " Future  - DX-LUMBAR SPINE-4+ VIEWS; Future  - Basic Metabolic Panel; Future    2. Chronic left hip pain  Chronic, persistent.  Continue advil vs aleve with food since these are providing temporary improvement.  Continue PT, warm vs cold packs, massage, chiropractic.  Xray ordered.  If normal, pt to get BMP with plan for MRI.  If abnormal, will consider ortho referral. Patient expressed understanding and agreement with plan.  - DX-LUMBAR SPINE-4+ VIEWS; Future  - DX-HIP-UNILATERAL-WITH PELVIS-1 VIEW LEFT; Future  - Basic Metabolic Panel; Future      Return in about 2 months (around 10/29/2022) for back follow-up.    Please note that this dictation was created using voice recognition software. I have made every reasonable attempt to correct obvious errors, but I expect that there are errors of grammar and possibly content that I did not discover before finalizing the note.

## 2022-08-31 ENCOUNTER — PATIENT MESSAGE (OUTPATIENT)
Dept: MEDICAL GROUP | Facility: MEDICAL CENTER | Age: 47
End: 2022-08-31
Payer: COMMERCIAL

## 2022-08-31 DIAGNOSIS — G89.29 CHRONIC LEFT-SIDED LOW BACK PAIN WITH LEFT-SIDED SCIATICA: ICD-10-CM

## 2022-08-31 DIAGNOSIS — M51.36 L4-L5 DISC BULGE: ICD-10-CM

## 2022-08-31 DIAGNOSIS — M71.38 SYNOVIAL CYST OF LUMBAR FACET JOINT: ICD-10-CM

## 2022-08-31 DIAGNOSIS — M54.42 CHRONIC LEFT-SIDED LOW BACK PAIN WITH LEFT-SIDED SCIATICA: ICD-10-CM

## 2022-08-31 DIAGNOSIS — M54.16 LUMBAR NERVE ROOT IMPINGEMENT: ICD-10-CM

## 2022-09-13 ENCOUNTER — APPOINTMENT (OUTPATIENT)
Dept: RADIOLOGY | Facility: MEDICAL CENTER | Age: 47
End: 2022-09-13
Attending: FAMILY MEDICINE
Payer: COMMERCIAL

## 2022-09-13 DIAGNOSIS — G89.29 CHRONIC LEFT-SIDED LOW BACK PAIN WITH LEFT-SIDED SCIATICA: ICD-10-CM

## 2022-09-13 DIAGNOSIS — M54.42 CHRONIC LEFT-SIDED LOW BACK PAIN WITH LEFT-SIDED SCIATICA: ICD-10-CM

## 2022-09-13 PROCEDURE — 72148 MRI LUMBAR SPINE W/O DYE: CPT

## 2022-09-14 ENCOUNTER — PATIENT MESSAGE (OUTPATIENT)
Dept: MEDICAL GROUP | Facility: MEDICAL CENTER | Age: 47
End: 2022-09-14
Payer: COMMERCIAL

## 2022-09-22 ENCOUNTER — PRE-ADMISSION TESTING (OUTPATIENT)
Dept: ADMISSIONS | Facility: MEDICAL CENTER | Age: 47
End: 2022-09-22
Attending: FAMILY MEDICINE
Payer: COMMERCIAL

## 2022-09-29 ENCOUNTER — APPOINTMENT (OUTPATIENT)
Dept: RADIOLOGY | Facility: MEDICAL CENTER | Age: 47
End: 2022-09-29
Attending: FAMILY MEDICINE
Payer: COMMERCIAL

## 2022-09-29 ENCOUNTER — HOSPITAL ENCOUNTER (OUTPATIENT)
Facility: MEDICAL CENTER | Age: 47
End: 2022-09-29
Attending: FAMILY MEDICINE | Admitting: FAMILY MEDICINE
Payer: COMMERCIAL

## 2022-09-29 VITALS
DIASTOLIC BLOOD PRESSURE: 77 MMHG | WEIGHT: 190.26 LBS | TEMPERATURE: 96.6 F | OXYGEN SATURATION: 94 % | HEIGHT: 70 IN | BODY MASS INDEX: 27.24 KG/M2 | RESPIRATION RATE: 18 BRPM | SYSTOLIC BLOOD PRESSURE: 166 MMHG | HEART RATE: 61 BPM

## 2022-09-29 DIAGNOSIS — M54.16 LUMBAR NERVE ROOT IMPINGEMENT: ICD-10-CM

## 2022-09-29 DIAGNOSIS — M51.36 L4-L5 DISC BULGE: ICD-10-CM

## 2022-09-29 DIAGNOSIS — M54.42 CHRONIC LEFT-SIDED LOW BACK PAIN WITH LEFT-SIDED SCIATICA: ICD-10-CM

## 2022-09-29 DIAGNOSIS — G89.29 CHRONIC LEFT-SIDED LOW BACK PAIN WITH LEFT-SIDED SCIATICA: ICD-10-CM

## 2022-09-29 DIAGNOSIS — M71.38 SYNOVIAL CYST OF LUMBAR FACET JOINT: ICD-10-CM

## 2022-09-29 LAB — HCG UR QL: NEGATIVE

## 2022-09-29 PROCEDURE — 700111 HCHG RX REV CODE 636 W/ 250 OVERRIDE (IP)

## 2022-09-29 PROCEDURE — 99153 MOD SED SAME PHYS/QHP EA: CPT

## 2022-09-29 PROCEDURE — 160002 HCHG RECOVERY MINUTES (STAT)

## 2022-09-29 PROCEDURE — 64483 NJX AA&/STRD TFRM EPI L/S 1: CPT

## 2022-09-29 PROCEDURE — 700105 HCHG RX REV CODE 258: Performed by: STUDENT IN AN ORGANIZED HEALTH CARE EDUCATION/TRAINING PROGRAM

## 2022-09-29 PROCEDURE — 160035 HCHG PACU - 1ST 60 MINS PHASE I

## 2022-09-29 PROCEDURE — 700101 HCHG RX REV CODE 250

## 2022-09-29 PROCEDURE — 160046 HCHG PACU - 1ST 60 MINS PHASE II

## 2022-09-29 PROCEDURE — 81025 URINE PREGNANCY TEST: CPT

## 2022-09-29 RX ORDER — IBUPROFEN 200 MG
200 TABLET ORAL EVERY 6 HOURS PRN
COMMUNITY
End: 2022-12-13

## 2022-09-29 RX ORDER — MIDAZOLAM HYDROCHLORIDE 1 MG/ML
INJECTION INTRAMUSCULAR; INTRAVENOUS
Status: COMPLETED
Start: 2022-09-29 | End: 2022-09-29

## 2022-09-29 RX ORDER — METHYLPREDNISOLONE ACETATE 80 MG/ML
INJECTION, SUSPENSION INTRA-ARTICULAR; INTRALESIONAL; INTRAMUSCULAR; SOFT TISSUE
Status: COMPLETED
Start: 2022-09-29 | End: 2022-09-29

## 2022-09-29 RX ORDER — LIDOCAINE HYDROCHLORIDE 20 MG/ML
INJECTION, SOLUTION INFILTRATION; PERINEURAL
Status: COMPLETED
Start: 2022-09-29 | End: 2022-09-29

## 2022-09-29 RX ORDER — SODIUM CHLORIDE 9 MG/ML
1000 INJECTION, SOLUTION INTRAVENOUS ONCE
Status: COMPLETED | OUTPATIENT
Start: 2022-09-29 | End: 2022-09-29

## 2022-09-29 RX ORDER — CHLORAL HYDRATE 500 MG
1000 CAPSULE ORAL DAILY
COMMUNITY

## 2022-09-29 RX ORDER — BUPIVACAINE HYDROCHLORIDE 2.5 MG/ML
INJECTION, SOLUTION EPIDURAL; INFILTRATION; INTRACAUDAL
Status: COMPLETED
Start: 2022-09-29 | End: 2022-09-29

## 2022-09-29 RX ORDER — ACETAMINOPHEN 500 MG
1000 TABLET ORAL EVERY 6 HOURS
Status: DISCONTINUED | OUTPATIENT
Start: 2022-09-29 | End: 2022-09-29 | Stop reason: HOSPADM

## 2022-09-29 RX ORDER — DEXAMETHASONE SODIUM PHOSPHATE 10 MG/ML
INJECTION, SOLUTION INTRAMUSCULAR; INTRAVENOUS
Status: COMPLETED
Start: 2022-09-29 | End: 2022-09-29

## 2022-09-29 RX ORDER — DOCUSATE SODIUM 100 MG/1
100 CAPSULE, LIQUID FILLED ORAL 2 TIMES DAILY
COMMUNITY
End: 2022-12-13

## 2022-09-29 RX ADMIN — FENTANYL CITRATE 50 MCG: 50 INJECTION, SOLUTION INTRAMUSCULAR; INTRAVENOUS at 13:56

## 2022-09-29 RX ADMIN — MIDAZOLAM HYDROCHLORIDE 1 MG: 1 INJECTION, SOLUTION INTRAMUSCULAR; INTRAVENOUS at 14:14

## 2022-09-29 RX ADMIN — FENTANYL CITRATE 50 MCG: 50 INJECTION, SOLUTION INTRAMUSCULAR; INTRAVENOUS at 14:13

## 2022-09-29 RX ADMIN — METHYLPREDNISOLONE ACETATE: 80 INJECTION, SUSPENSION INTRA-ARTICULAR; INTRALESIONAL; INTRAMUSCULAR; SOFT TISSUE at 14:25

## 2022-09-29 RX ADMIN — SODIUM CHLORIDE 1000 ML: 9 INJECTION, SOLUTION INTRAVENOUS at 11:12

## 2022-09-29 RX ADMIN — BUPIVACAINE HYDROCHLORIDE: 2.5 INJECTION, SOLUTION EPIDURAL; INFILTRATION; INTRACAUDAL; PERINEURAL at 14:25

## 2022-09-29 RX ADMIN — DEXAMETHASONE SODIUM PHOSPHATE: 10 INJECTION INTRAMUSCULAR; INTRAVENOUS at 14:25

## 2022-09-29 RX ADMIN — LIDOCAINE HYDROCHLORIDE 10 ML: 20 INJECTION, SOLUTION INFILTRATION; PERINEURAL at 14:05

## 2022-09-29 RX ADMIN — MIDAZOLAM HYDROCHLORIDE 1 MG: 1 INJECTION, SOLUTION INTRAMUSCULAR; INTRAVENOUS at 13:56

## 2022-09-29 ASSESSMENT — FIBROSIS 4 INDEX: FIB4 SCORE: 0.62

## 2022-09-29 ASSESSMENT — PAIN DESCRIPTION - PAIN TYPE: TYPE: CHRONIC PAIN

## 2022-09-29 NOTE — PROGRESS NOTES
Pt presents to [facility]. Pt was consented by MD at bedside, confirmed by this RN and consent at bedside. Pt transferred to CT table in prone position.     Report off to Viridiana JOHNSON, 2 mg Versed 100 mcg Fentanyl drawn and given to Viridiana JOHNSON.

## 2022-09-29 NOTE — OR SURGEON
Immediate Post- Operative Note        Findings: Left L4-5 synovial cyst       Procedure(s): L4-5 synovial cyst rupture and L5 transformational nerve root injection      Estimated Blood Loss: Less than 5 ml        Complications: None            9/29/2022     2:42 PM     Kyle Ly M.D.

## 2022-09-29 NOTE — DISCHARGE INSTRUCTIONS
HOME CARE INSTRUCTIONS    ACTIVITY: Rest and take it easy for the first 24 hours.  A responsible adult is recommended to remain with you during that time.  It is normal to feel sleepy.  We encourage you to not do anything that requires balance, judgment or coordination.    FOR 24 HOURS DO NOT:  Drive, operate machinery or run household appliances.  Drink beer or alcoholic beverages.  Make important decisions or sign legal documents.    DIET: To avoid nausea, slowly advance diet as tolerated, avoiding spicy or greasy foods for the first day.  Add more substantial food to your diet according to your physician's instructions.  Babies can be fed formula or breast milk as soon as they are hungry.  INCREASE FLUIDS AND FIBER TO AVOID CONSTIPATION.    SURGICAL DRESSING/BATHING: you may shower tomorrow, and remove dressing in 24 hours. They are waterproof so you can shower and then remove dressing afterwards    MEDICATIONS: Resume taking daily medication.  Take prescribed pain medication with food.  If no medication is prescribed, you may take non-aspirin pain medication if needed.  PAIN MEDICATION CAN BE VERY CONSTIPATING.  Take a stool softener or laxative such as senokot, pericolace, or milk of magnesia if needed.    A follow-up appointment should be arranged with your doctor; call to schedule.    You should CALL YOUR PHYSICIAN if you develop:  Fever greater than 101 degrees F.  Pain not relieved by medication, or persistent nausea or vomiting.  Excessive bleeding (blood soaking through dressing) or unexpected drainage from the wound.  Extreme redness or swelling around the incision site, drainage of pus or foul smelling drainage.  Inability to urinate or empty your bladder within 8 hours.  Problems with breathing or chest pain.    You should call 911 if you develop problems with breathing or chest pain.  If you are unable to contact your doctor or surgical center, you should go to the nearest emergency room or urgent  [facility] center.  Physician's telephone #: [phone] Dr Garcia    MILD FLU-LIKE SYMPTOMS ARE NORMAL.  YOU MAY EXPERIENCE GENERALIZED MUSCLE ACHES, THROAT IRRITATION, HEADACHE AND/OR SOME NAUSEA.    If any questions arise, call your doctor.  If your doctor is not available, please feel free to call the Surgical Center at [phone].  The Center is open Monday through Friday from 7AM to 7PM.      A registered nurse may call you a few days after your surgery to see how you are doing after your procedure.    You may also receive a survey in the mail within the next two weeks and we ask that you take a few moments to complete the survey and return it to us.  Our goal is to provide you with very good care and we value your comments.     Depression / Suicide Risk    As you are discharged from this [facility] facility, it is important to learn how to keep safe from harming yourself.    Recognize the warning signs:  Abrupt changes in personality, positive or negative- including increase in energy   Giving away possessions  Change in eating patterns- significant weight changes-  positive or negative  Change in sleeping patterns- unable to sleep or sleeping all the time   Unwillingness or inability to communicate  Depression  Unusual sadness, discouragement and loneliness  Talk of wanting to die  Neglect of personal appearance   Rebelliousness- reckless behavior  Withdrawal from people/activities they love  Confusion- inability to concentrate     If you or a loved one observes any of these behaviors or has concerns about self-harm, here's what you can do:  Talk about it- your feelings and reasons for harming yourself  Remove any means that you might use to hurt yourself (examples: pills, rope, extension cords, firearm)  Get professional help from the community (Mental Health, Substance Abuse, psychological counseling)  Do not be alone:Call your Safe Contact- someone whom you trust who will be there for you.  Call your  local CRISIS HOTLINE 580-7091 or 406-058-4703  Call your local Children's Mobile Crisis Response Team [place] (482) 561-2101 or www.Appetise  Call the toll free National Suicide Prevention Hotlines   National Suicide Prevention Lifeline 371-325-GTTX (8326)  [facility] Line Network 800-SUICIDE (982-4321)    I acknowledge receipt and understanding of these Home Care instructions.

## 2022-09-29 NOTE — PROGRESS NOTES
Patient in pre-op, assessment completed, patient and  harmony updated on plan of care, all questions answered, no further needs at this time, call light within reach.

## 2022-09-29 NOTE — OR NURSING
Awake, alert and tolerating PO fluids.  Denies pain/nausea.  Injection sites on lower back are clean and dry with no bleeding  Vitals stable.  On monitors with alarms audible.    Called family member with update and approximate DC timeline.

## 2022-10-03 ENCOUNTER — APPOINTMENT (OUTPATIENT)
Dept: RADIOLOGY | Facility: MEDICAL CENTER | Age: 47
End: 2022-10-03
Attending: FAMILY MEDICINE
Payer: COMMERCIAL

## 2022-10-14 ENCOUNTER — APPOINTMENT (OUTPATIENT)
Dept: RADIOLOGY | Facility: MEDICAL CENTER | Age: 47
End: 2022-10-14
Attending: UROLOGY
Payer: COMMERCIAL

## 2022-10-17 ENCOUNTER — HOSPITAL ENCOUNTER (OUTPATIENT)
Dept: RADIOLOGY | Facility: MEDICAL CENTER | Age: 47
End: 2022-10-17
Attending: UROLOGY
Payer: COMMERCIAL

## 2022-10-17 DIAGNOSIS — M54.2 CERVICALGIA: ICD-10-CM

## 2022-10-17 PROCEDURE — 72141 MRI NECK SPINE W/O DYE: CPT

## 2022-12-12 PROBLEM — U07.1 COVID: Status: ACTIVE | Noted: 2022-12-12

## 2022-12-12 NOTE — PROGRESS NOTES
Virtual Visit: Established Patient   This visit was conducted via Zoom using secure and encrypted videoconferencing technology.   The patient was in their home in the state [facility].    The patient's identity was confirmed and verbal consent was obtained for this virtual visit.     Subjective:   CC:   Chief Complaint   Patient presents with    Runny Nose     Tested (+) covid Sunday. Now with runny nose and sinus congestion with a slight cough.       Janette Swan is a 47 y.o. female presenting for evaluation and management of:    COVID 19- symptom onset 12/9/22 with positive test on 12/11/22.  Symptoms started with sore throat followed by muscle aches.  Muscle aches resolved.  No fevers, chills.  She has productive cough due to post nasal drip and cough does not feel deep in her lungs.  No SOB or wheezing.  No chest pain or palpitations.  No nausea, vomiting, diarrhea.  She has congestion and yellow nasal discharge that has been worsening over the last 24 hours.  Her sore throat is improving.  She is concerned about sinus infection.  +ear clogged sensation but no pain.  Has been using nasal saline.  She is eating well and hydrating well.    Current medicines (including changes today)  Current Outpatient Medications   Medication Sig Dispense Refill    vitamin D3 (CHOLECALCIFEROL) 1000 Unit (25 mcg) Tab Take 1,000 Units by mouth every day.      Multiple Vitamin (MULTIVITAMIN ADULT PO) Take 1 Tablet by mouth every day.      amoxicillin-clavulanate (AUGMENTIN) 875-125 MG Tab Take 1 Tablet by mouth 2 times a day for 7 days. For sinusitis 14 Tablet 0    Omega-3 Fatty Acids (FISH OIL) 1000 MG Cap capsule Take 1,000 mg by mouth every day.      LO LOESTRIN FE 1 MG-10 MCG / 10 MCG Tab Take 1 Tablet by mouth every day.      metoprolol tartrate (LOPRESSOR) 25 MG Tab Take 0.5 Tablets by mouth 2 times a day. 90 Tablet 0    MAGNESIUM PO Take  by mouth.       No current facility-administered medications for this visit.  "      Patient Active Problem List    Diagnosis Date Noted    COVID 12/12/2022    Chronic left hip pain 08/29/2022    Chronic left-sided low back pain with sciatica 06/13/2022    Palpitations 11/25/2020    Occult blood in stools 11/16/2020    Chronic neck pain 11/16/2020    Neck swelling 11/16/2020    Bilateral carpal tunnel syndrome 11/16/2020    Frequent PVCs 06/08/2018    Abnormal mammogram of right breast 03/22/2018    LUIS FELIPE (obstructive sleep apnea) 10/30/2017    Vitamin D deficiency disease 08/27/2015    Pure hypercholesterolemia 08/27/2015    Irritable bowel syndrome (IBS) 04/25/2014        Objective:   Temp 36.8 °C (98.2 °F) (Temporal) Comment: Pt reported  Ht 1.778 m (5' 10\") Comment: Pt reported  Wt 83.9 kg (185 lb) Comment: Pt reported  BMI 26.54 kg/m²     Physical Exam:  Constitutional: Alert, no distress, well-groomed.  HEENT: Pt reports swelling of her maxillary sinus region but not palpable discomfort.    Skin: No rashes in visible areas.  Eye: Round. Conjunctiva clear, lids normal. No icterus.   ENMT: Lips pink without lesions, good dentition, moist mucous membranes. Phonation normal.  Speaking in full sentences.  Denies neck swelling or lymph nodes palpable.  Neck: No masses, no thyromegaly. Moves freely without pain.  Respiratory: Unlabored respiratory effort, no cough or audible wheeze  Psych: Alert and oriented x3, normal affect and mood.     Assessment and Plan:   The following treatment plan was discussed:     1. COVID  New issue.  Symptom onset 12/9/22 with improving symptoms.  No red flags at this time.  VS stable.  She is currently on day 4 since onset of symptoms.  Given her mild elevation in BMI, she qualifies for Paxil bid.  Side effect profile discussed.  We discussed that this is approved by the FDA for  emergency authorization utilization and that he must be used within 5 days of onset of symptoms.  Given her improving symptoms, lack of fever and lack of cough and lack of shortness of " breath, she elects to defer Paxil at this time.  She will reach out to me if she changes her mind within the next 24 hours.  Encouraged rest, hydration, well-balanced diet, nasal saline, Flonase and treatment of her sinusitis per below.  Follow-up precautions given    2. Acute maxillary sinusitis, recurrence not specified  New issue, onset within the last 24 to 48 hours, progressively worsening.  Discussed with her that this is most likely to her viral infection due to COVID-19 and that antibiotics would have more risk than harm if this is viral.  If her symptoms last more than 7 days or get better and then worsen or become significantly worse, and she would be a good candidate for antibiotics since the infection would be most likely due to the bacterial infection.  I provided her with a pocket prescription of Augmentin.  Side effect profile and probiotics recommended.  Recommended nasal saline, Flonase, rest, hydration, well-balanced diet.  Recommended over-the-counter ibuprofen 200 to 400 mg every 6-8 hours with food as needed for pain.  Follow-up precautions given  - amoxicillin-clavulanate (AUGMENTIN) 875-125 MG Tab; Take 1 Tablet by mouth 2 times a day for 7 days. For sinusitis  Dispense: 14 Tablet; Refill: 0      Follow-up: Return if symptoms worsen or fail to improve.

## 2022-12-13 ENCOUNTER — TELEMEDICINE (OUTPATIENT)
Dept: MEDICAL GROUP | Facility: MEDICAL CENTER | Age: 47
End: 2022-12-13
Payer: COMMERCIAL

## 2022-12-13 VITALS — HEIGHT: 70 IN | BODY MASS INDEX: 26.48 KG/M2 | WEIGHT: 185 LBS | TEMPERATURE: 98.2 F

## 2022-12-13 DIAGNOSIS — U07.1 COVID: ICD-10-CM

## 2022-12-13 DIAGNOSIS — J01.00 ACUTE MAXILLARY SINUSITIS, RECURRENCE NOT SPECIFIED: ICD-10-CM

## 2022-12-13 PROCEDURE — 99214 OFFICE O/P EST MOD 30 MIN: CPT | Mod: 95 | Performed by: FAMILY MEDICINE

## 2022-12-13 RX ORDER — AMOXICILLIN AND CLAVULANATE POTASSIUM 875; 125 MG/1; MG/1
1 TABLET, FILM COATED ORAL 2 TIMES DAILY
Qty: 14 TABLET | Refills: 0 | Status: SHIPPED | OUTPATIENT
Start: 2022-12-13 | End: 2022-12-20

## 2022-12-13 RX ORDER — VITAMIN B COMPLEX
1000 TABLET ORAL DAILY
COMMUNITY

## 2022-12-13 ASSESSMENT — FIBROSIS 4 INDEX: FIB4 SCORE: 0.62

## 2022-12-30 ENCOUNTER — APPOINTMENT (OUTPATIENT)
Dept: RADIOLOGY | Facility: MEDICAL CENTER | Age: 47
End: 2022-12-30
Attending: EMERGENCY MEDICINE
Payer: COMMERCIAL

## 2022-12-30 ENCOUNTER — HOSPITAL ENCOUNTER (EMERGENCY)
Facility: MEDICAL CENTER | Age: 47
End: 2022-12-30
Attending: EMERGENCY MEDICINE
Payer: COMMERCIAL

## 2022-12-30 VITALS
RESPIRATION RATE: 17 BRPM | BODY MASS INDEX: 27.62 KG/M2 | HEART RATE: 69 BPM | OXYGEN SATURATION: 95 % | TEMPERATURE: 98.3 F | HEIGHT: 70 IN | WEIGHT: 192.9 LBS | DIASTOLIC BLOOD PRESSURE: 79 MMHG | SYSTOLIC BLOOD PRESSURE: 138 MMHG

## 2022-12-30 DIAGNOSIS — R10.13 EPIGASTRIC PAIN: ICD-10-CM

## 2022-12-30 DIAGNOSIS — K82.4 GALLBLADDER POLYP: ICD-10-CM

## 2022-12-30 DIAGNOSIS — R11.0 NAUSEA: ICD-10-CM

## 2022-12-30 LAB
ALBUMIN SERPL BCP-MCNC: 4.2 G/DL (ref 3.2–4.9)
ALBUMIN/GLOB SERPL: 1.4 G/DL
ALP SERPL-CCNC: 40 U/L (ref 30–99)
ALT SERPL-CCNC: 15 U/L (ref 2–50)
ANION GAP SERPL CALC-SCNC: 13 MMOL/L (ref 7–16)
APPEARANCE UR: CLEAR
AST SERPL-CCNC: 20 U/L (ref 12–45)
BACTERIA #/AREA URNS HPF: NEGATIVE /HPF
BASOPHILS # BLD AUTO: 0.3 % (ref 0–1.8)
BASOPHILS # BLD: 0.03 K/UL (ref 0–0.12)
BILIRUB SERPL-MCNC: <0.2 MG/DL (ref 0.1–1.5)
BILIRUB UR QL STRIP.AUTO: NEGATIVE
BUN SERPL-MCNC: 6 MG/DL (ref 8–22)
CALCIUM ALBUM COR SERPL-MCNC: 9.1 MG/DL (ref 8.5–10.5)
CALCIUM SERPL-MCNC: 9.3 MG/DL (ref 8.5–10.5)
CHLORIDE SERPL-SCNC: 103 MMOL/L (ref 96–112)
CO2 SERPL-SCNC: 21 MMOL/L (ref 20–33)
COLOR UR: YELLOW
CREAT SERPL-MCNC: 0.57 MG/DL (ref 0.5–1.4)
EKG IMPRESSION: NORMAL
EOSINOPHIL # BLD AUTO: 0.15 K/UL (ref 0–0.51)
EOSINOPHIL NFR BLD: 1.4 % (ref 0–6.9)
EPI CELLS #/AREA URNS HPF: NEGATIVE /HPF
ERYTHROCYTE [DISTWIDTH] IN BLOOD BY AUTOMATED COUNT: 41.1 FL (ref 35.9–50)
GFR SERPLBLD CREATININE-BSD FMLA CKD-EPI: 111 ML/MIN/1.73 M 2
GLOBULIN SER CALC-MCNC: 3.1 G/DL (ref 1.9–3.5)
GLUCOSE SERPL-MCNC: 103 MG/DL (ref 65–99)
GLUCOSE UR STRIP.AUTO-MCNC: NEGATIVE MG/DL
HCG SERPL QL: NEGATIVE
HCT VFR BLD AUTO: 40.8 % (ref 37–47)
HGB BLD-MCNC: 13.8 G/DL (ref 12–16)
HYALINE CASTS #/AREA URNS LPF: ABNORMAL /LPF
IMM GRANULOCYTES # BLD AUTO: 0.04 K/UL (ref 0–0.11)
IMM GRANULOCYTES NFR BLD AUTO: 0.4 % (ref 0–0.9)
KETONES UR STRIP.AUTO-MCNC: NEGATIVE MG/DL
LEUKOCYTE ESTERASE UR QL STRIP.AUTO: NEGATIVE
LIPASE SERPL-CCNC: 35 U/L (ref 11–82)
LYMPHOCYTES # BLD AUTO: 4.77 K/UL (ref 1–4.8)
LYMPHOCYTES NFR BLD: 44.2 % (ref 22–41)
MCH RBC QN AUTO: 29.2 PG (ref 27–33)
MCHC RBC AUTO-ENTMCNC: 33.8 G/DL (ref 33.6–35)
MCV RBC AUTO: 86.4 FL (ref 81.4–97.8)
MICRO URNS: ABNORMAL
MONOCYTES # BLD AUTO: 0.58 K/UL (ref 0–0.85)
MONOCYTES NFR BLD AUTO: 5.4 % (ref 0–13.4)
NEUTROPHILS # BLD AUTO: 5.22 K/UL (ref 2–7.15)
NEUTROPHILS NFR BLD: 48.3 % (ref 44–72)
NITRITE UR QL STRIP.AUTO: NEGATIVE
NRBC # BLD AUTO: 0 K/UL
NRBC BLD-RTO: 0 /100 WBC
PH UR STRIP.AUTO: 6.5 [PH] (ref 5–8)
PLATELET # BLD AUTO: 352 K/UL (ref 164–446)
PMV BLD AUTO: 8.9 FL (ref 9–12.9)
POTASSIUM SERPL-SCNC: 3.9 MMOL/L (ref 3.6–5.5)
PROT SERPL-MCNC: 7.3 G/DL (ref 6–8.2)
PROT UR QL STRIP: NEGATIVE MG/DL
RBC # BLD AUTO: 4.72 M/UL (ref 4.2–5.4)
RBC # URNS HPF: ABNORMAL /HPF
RBC UR QL AUTO: ABNORMAL
SODIUM SERPL-SCNC: 137 MMOL/L (ref 135–145)
SP GR UR STRIP.AUTO: 1.01
UROBILINOGEN UR STRIP.AUTO-MCNC: 0.2 MG/DL
WBC # BLD AUTO: 10.8 K/UL (ref 4.8–10.8)
WBC #/AREA URNS HPF: ABNORMAL /HPF

## 2022-12-30 PROCEDURE — 84703 CHORIONIC GONADOTROPIN ASSAY: CPT

## 2022-12-30 PROCEDURE — 76705 ECHO EXAM OF ABDOMEN: CPT

## 2022-12-30 PROCEDURE — 81001 URINALYSIS AUTO W/SCOPE: CPT

## 2022-12-30 PROCEDURE — 36415 COLL VENOUS BLD VENIPUNCTURE: CPT

## 2022-12-30 PROCEDURE — 83690 ASSAY OF LIPASE: CPT

## 2022-12-30 PROCEDURE — 85025 COMPLETE CBC W/AUTO DIFF WBC: CPT

## 2022-12-30 PROCEDURE — 93005 ELECTROCARDIOGRAM TRACING: CPT | Performed by: EMERGENCY MEDICINE

## 2022-12-30 PROCEDURE — A9270 NON-COVERED ITEM OR SERVICE: HCPCS | Performed by: EMERGENCY MEDICINE

## 2022-12-30 PROCEDURE — 99284 EMERGENCY DEPT VISIT MOD MDM: CPT

## 2022-12-30 PROCEDURE — 93005 ELECTROCARDIOGRAM TRACING: CPT

## 2022-12-30 PROCEDURE — 80053 COMPREHEN METABOLIC PANEL: CPT

## 2022-12-30 PROCEDURE — 700102 HCHG RX REV CODE 250 W/ 637 OVERRIDE(OP): Performed by: EMERGENCY MEDICINE

## 2022-12-30 RX ORDER — ONDANSETRON 4 MG/1
4 TABLET, ORALLY DISINTEGRATING ORAL EVERY 8 HOURS PRN
Qty: 10 TABLET | Refills: 0 | Status: SHIPPED | OUTPATIENT
Start: 2022-12-30

## 2022-12-30 RX ADMIN — LIDOCAINE HYDROCHLORIDE 30 ML: 20 SOLUTION OROPHARYNGEAL at 04:58

## 2022-12-30 ASSESSMENT — FIBROSIS 4 INDEX: FIB4 SCORE: 0.62

## 2022-12-30 NOTE — ED TRIAGE NOTES
"Chief Complaint   Patient presents with    Abdominal Pain     Started around 2200 tonight to the mid upper area.  Denies N/V/D.  Denies any alleviating factors    Chest Pain     Tightening over the last few days, not occurring at this time.  Hx of bi and trigeminy PVC's       Pt ambulated to triage. Pt A&Ox4, came in for above complaint. EKG completed prior to triage    Pt to lobby . Pt educated on alerting staff in changes to condition. Pt verbalized understanding. Protocol ordered for abdominal pain.    BP (!) 190/109   Pulse 76   Temp 36.4 °C (97.6 °F) (Temporal)   Resp 16   Ht 1.778 m (5' 10\")   Wt 87.5 kg (192 lb 14.4 oz)   SpO2 97%   BMI 27.68 kg/m²     "

## 2022-12-30 NOTE — ED NOTES
Discharge paper given and explained to patient; verbalized understanding on home meds and instructions; belongings with patient

## 2022-12-30 NOTE — ED PROVIDER NOTES
ER Provider Note    Scribed for Blas Garcia M.d. by Viola Terry. 12/30/2022  4:09 AM    Primary Care Provider: Korin Garcia D.O.    CHIEF COMPLAINT  Chief Complaint   Patient presents with    Abdominal Pain     Started around 2200 tonight to the mid upper area.  Denies N/V/D.  Denies any alleviating factors    Chest Pain     Tightening over the last few days, not occurring at this time.  Hx of bi and trigeminy PVC's     EXTERNAL RECORDS REVIEWED      HPI  LIMITATION TO HISTORY   Select: : None  OUTSIDE HISTORIAN(S):  None pertinent    Janette Swan is a 47 y.o. female who presents to the ED complaining of waxing and waning upper abdominal pain onset 9:00 PM while laying down. She denies any radiation to other areas. She describes her pain as indigestion-like. No alleviating factors attempted. She denies any nausea or vomiting. She notes her pain was initially a 7/10 but is now a 5/10. She denies any history of similar symptoms. She denies any history of abdominal surgeries.     REVIEW OF SYSTEMS  Pertinent positives include upper abdominal pain. Pertinent negatives include no nausea or vomiting.  All other systems reviewed and negative.     PAST MEDICAL HISTORY  Past Medical History:   Diagnosis Date    Advance directive in chart 08/15/2002    Arrhythmia 09/2022    PVC's    Back pain 09/29/2022    down left leg    Frequent headaches     History of intestinal parasite 1994    after [place] trip    History of pneumonia 12/2011    IBS (irritable bowel syndrome)     Morning headache     Nephrolithiasis 04/24/2014       SURGICAL HISTORY  Past Surgical History:   Procedure Laterality Date    TONSILLECTOMY  1995    TURBINECTOMY         FAMILY HISTORY  Family History   Problem Relation Age of Onset    Hypertension Mother     Hyperlipidemia Mother     Heart Disease Mother 77        MI    Sleep Apnea Mother     Hypertension Father     Hyperlipidemia Father     Other Father         Alzheimers    Sleep Apnea  "Father     Diabetes Other        SOCIAL HISTORY   reports that she has never smoked. She has never used smokeless tobacco. She reports current alcohol use of about 0.6 oz per week. She reports current drug use. Drugs: Marijuana and Oral.    CURRENT MEDICATIONS  Previous Medications    LO LOESTRIN FE 1 MG-10 MCG / 10 MCG TAB    Take 1 Tablet by mouth every day.    MAGNESIUM PO    Take  by mouth.    METOPROLOL TARTRATE (LOPRESSOR) 25 MG TAB    Take 0.5 Tablets by mouth 2 times a day.    MULTIPLE VITAMIN (MULTIVITAMIN ADULT PO)    Take 1 Tablet by mouth every day.    OMEGA-3 FATTY ACIDS (FISH OIL) 1000 MG CAP CAPSULE    Take 1,000 mg by mouth every day.    VITAMIN D3 (CHOLECALCIFEROL) 1000 UNIT (25 MCG) TAB    Take 1,000 Units by mouth every day.       ALLERGIES  Codeine    PHYSICAL EXAM  BP (!) 190/109   Pulse 76   Temp 36.4 °C (97.6 °F) (Temporal)   Resp 16   Ht 1.778 m (5' 10\")   Wt 87.5 kg (192 lb 14.4 oz)   SpO2 97%   BMI 27.68 kg/m²     Nursing note and vitals reviewed.  Constitutional: Well-developed and well-nourished. No distress.   HENT: Head is normocephalic and atraumatic. Oropharynx is clear and moist without exudate or erythema.   Eyes: Pupils are equal, round, and reactive to light. Conjunctiva are normal.   Cardiovascular: Normal rate and regular rhythm. No murmur heard. Normal radial pulses.  Pulmonary/Chest: Breath sounds normal. No wheezes or rales.   Abdominal: Soft, mild epigastric region tenderness, negative Magdaleno's sign and non-distended. Normal active bowel sounds. No guarding or peritoneal signs. No palpable abdominal aortic aneurysm. No masses. No tenderness at McBurney's point.   Musculoskeletal: Extremities exhibit normal range of motion without edema or tenderness.   Neurological: Awake, alert and oriented to person, place, and time. No focal deficits noted.  Skin: Skin is warm and dry. No rash.  Psychiatric: Normal mood and affect. Appropriate for clinical situation    DIAGNOSTIC " STUDIES    Labs:   Results for orders placed or performed during the hospital encounter of 12/30/22   CBC WITH DIFFERENTIAL   Result Value Ref Range    WBC 10.8 4.8 - 10.8 K/uL    RBC 4.72 4.20 - 5.40 M/uL    Hemoglobin 13.8 12.0 - 16.0 g/dL    Hematocrit 40.8 37.0 - 47.0 %    MCV 86.4 81.4 - 97.8 fL    MCH 29.2 27.0 - 33.0 pg    MCHC 33.8 33.6 - 35.0 g/dL    RDW 41.1 35.9 - 50.0 fL    Platelet Count 352 164 - 446 K/uL    MPV 8.9 (L) 9.0 - 12.9 fL    Neutrophils-Polys 48.30 44.00 - 72.00 %    Lymphocytes 44.20 (H) 22.00 - 41.00 %    Monocytes 5.40 0.00 - 13.40 %    Eosinophils 1.40 0.00 - 6.90 %    Basophils 0.30 0.00 - 1.80 %    Immature Granulocytes 0.40 0.00 - 0.90 %    Nucleated RBC 0.00 /100 WBC    Neutrophils (Absolute) 5.22 2.00 - 7.15 K/uL    Lymphs (Absolute) 4.77 1.00 - 4.80 K/uL    Monos (Absolute) 0.58 0.00 - 0.85 K/uL    Eos (Absolute) 0.15 0.00 - 0.51 K/uL    Baso (Absolute) 0.03 0.00 - 0.12 K/uL    Immature Granulocytes (abs) 0.04 0.00 - 0.11 K/uL    NRBC (Absolute) 0.00 K/uL   COMP METABOLIC PANEL   Result Value Ref Range    Sodium 137 135 - 145 mmol/L    Potassium 3.9 3.6 - 5.5 mmol/L    Chloride 103 96 - 112 mmol/L    Co2 21 20 - 33 mmol/L    Anion Gap 13.0 7.0 - 16.0    Glucose 103 (H) 65 - 99 mg/dL    Bun 6 (L) 8 - 22 mg/dL    Creatinine 0.57 0.50 - 1.40 mg/dL    Calcium 9.3 8.5 - 10.5 mg/dL    AST(SGOT) 20 12 - 45 U/L    ALT(SGPT) 15 2 - 50 U/L    Alkaline Phosphatase 40 30 - 99 U/L    Total Bilirubin <0.2 0.1 - 1.5 mg/dL    Albumin 4.2 3.2 - 4.9 g/dL    Total Protein 7.3 6.0 - 8.2 g/dL    Globulin 3.1 1.9 - 3.5 g/dL    A-G Ratio 1.4 g/dL   LIPASE   Result Value Ref Range    Lipase 35 11 - 82 U/L   HCG QUAL SERUM   Result Value Ref Range    Beta-Hcg Qualitative Serum Negative Negative   URINALYSIS,CULTURE IF INDICATED    Specimen: Urine, Clean Catch   Result Value Ref Range    Color Yellow     Character Clear     Specific Gravity 1.010 <1.035    Ph 6.5 5.0 - 8.0    Glucose Negative Negative  mg/dL    Ketones Negative Negative mg/dL    Protein Negative Negative mg/dL    Bilirubin Negative Negative    Urobilinogen, Urine 0.2 Negative    Nitrite Negative Negative    Leukocyte Esterase Negative Negative    Occult Blood Trace (A) Negative    Micro Urine Req Microscopic    CORRECTED CALCIUM   Result Value Ref Range    Correct Calcium 9.1 8.5 - 10.5 mg/dL   ESTIMATED GFR   Result Value Ref Range    GFR (CKD-EPI) 111 >60 mL/min/1.73 m 2   URINE MICROSCOPIC (W/UA)   Result Value Ref Range    WBC 0-2 /hpf    RBC 2-5 (A) /hpf    Bacteria Negative None /hpf    Epithelial Cells Negative /hpf    Hyaline Cast 0-2 /lpf   EKG (NOW)   Result Value Ref Range    Report       [facility] Emergency Dept.    Test Date:  2022  Pt Name:    MACHO GARCIA                   Department: ER  MRN:        [id number]                      Room:       GR 38  Gender:     Female                       Technician: [id number]  :        1975                   Requested By:ER TRIAGE PROTOCOL  Order #:    [id number]                    Reading MD: GERARD GRAHAM MD    Measurements  Intervals                                Axis  Rate:       67                           P:          50  TN:         143                          QRS:        -51  QRSD:       115                          T:          45  QT:         454  QTc:        480    Interpretive Statements  Sinus rhythm  LAD, consider left anterior fascicular block  No previous ECG available for comparison  Electronically Signed On 2022 7:53:04 PST by GERARD GRAHAM MD         EKG:   Results for orders placed or performed during the hospital encounter of 22   EKG (NOW)   Result Value Ref Range    Report       [facility] Emergency Dept.    Test Date:  2022  Pt Name:    MACHO GARCIA                   Department: ER  MRN:        [id number]                      Room:       GR 38  Gender:     Female                       Technician: [id number]  :         1975                   Requested By:ER TRIAGE PROTOCOL  Order #:    [id number]                    Reading MD: GERARD GRAHAM MD    Measurements  Intervals                                Axis  Rate:       67                           P:          50  KS:         143                          QRS:        -51  QRSD:       115                          T:          45  QT:         454  QTc:        480    Interpretive Statements  Sinus rhythm  LAD, consider left anterior fascicular block  No previous ECG available for comparison  Electronically Signed On 12- 7:53:04 PST by GERARD GRAHAM MD          Radiology:   The attending emergency physician has independently interpreted the diagnostic imaging associated with this visit and am waiting the final reading from the radiologist.   US-RUQ   Final Result      1.  Multiple gallbladder polyps measuring up to 4 mm in size.      2.  No gallstones or biliary ductal dilatation.      3.  The liver is echogenic consistent with fatty change versus hepatocellular dysfunction.         COURSE & MEDICAL DECISION MAKING     Nursing notes, vital signs, PMSFSHx reviewed in chart     INITIAL ASSESSMENT AND PLAN  Prior records reviewed which indicate no prior emergency department visit    Escalation of care considered, and ultimately not performed: acute inpatient care management, however at this time, the patient is most appropriate for outpatient management.    Barriers to care at this time, including but not limited to: None.     Diagnostic tests and prescription drugs considered including, but not limited to: Select: Antibiotics   and Pain Medications   .    FINAL PROBLEM LIST AND PLAN    In addition to the chief complaint, the following problems were addressed: None    I have discussed management of the patient with the following physicians and STEVE's:  None    Discussion of management with other QHP or appropriate source(s): Select: None     4:10 AM - Patient seen and  examined at bedside. Discussed plan of care, including labs and GI cocktail.The patient will be medicated with GI cocktail 30 mL. Ordered for EKG, UA, Corrected Calcium, CBC with differential, CMP, Lipase, HCG Qual Serum and UA Culture to evaluate her symptoms. Differential diagnoses include but not limited to: gastritis, peptic ulcer disease, pancreatis or cholelithiasis. I reviewed laboratory results which were unremarkable and discussed them with the patient. Her Lipase is not elevated which rules out pancreatitis. Laboratory results rule out appendicitis. Patient agrees to the plan of care.     6:40 AM - Patient was seen at bedside. I updated her on imaging results included polyps in her gallbladder. She reports feeling improved following medications. She was instructed to follow up with surgery regarding her visit today. She was informed of the plan for discharge home with zofran. She was advised of all return precautions. Patient verbalizes understanding and agreement to this plan of care.     The patient will return for new or worsening symptoms and is stable at the time of discharge.    The patient is referred to a primary physician for blood pressure management, diabetic screening, and for all other preventative health concerns.    DISPOSITION:  Patient will be discharged home in stable condition.    FOLLOW UP:  Korin Garcia D.O.  [address]  [address]  [place] [id number] [phone]    Schedule an appointment as soon as possible for a visit       [facility], Emergency Dept  [address] [id number] [phone]    If symptoms worsen    Gilmer Onofre M.D.  [address] [facility]  [address]  [facility] [id number] [phone]    Schedule an appointment as soon as possible for a visit         OUTPATIENT MEDICATIONS:  New Prescriptions    ONDANSETRON (ZOFRAN ODT) 4 MG TABLET DISPERSIBLE    Take 1 Tablet by mouth every 8 hours as needed for Nausea/Vomiting.      FINAL IMPRESSION   1. Epigastric pain    2. Nausea    3. Gallbladder polyp             The note accurately reflects work and decisions made by me.  Blas Garcia M.D.  12/30/2022  11:51 AM

## 2023-01-04 ENCOUNTER — OFFICE VISIT (OUTPATIENT)
Dept: MEDICAL GROUP | Facility: MEDICAL CENTER | Age: 48
End: 2023-01-04
Payer: COMMERCIAL

## 2023-01-04 VITALS
HEIGHT: 70 IN | SYSTOLIC BLOOD PRESSURE: 118 MMHG | HEART RATE: 72 BPM | BODY MASS INDEX: 27.7 KG/M2 | OXYGEN SATURATION: 93 % | TEMPERATURE: 98.7 F | DIASTOLIC BLOOD PRESSURE: 82 MMHG | WEIGHT: 193.45 LBS

## 2023-01-04 DIAGNOSIS — K82.4 GALLBLADDER POLYP: ICD-10-CM

## 2023-01-04 DIAGNOSIS — Z01.84 IMMUNITY STATUS TESTING: ICD-10-CM

## 2023-01-04 DIAGNOSIS — R09.82 POST-NASAL DRIP: ICD-10-CM

## 2023-01-04 DIAGNOSIS — R00.2 PALPITATIONS: ICD-10-CM

## 2023-01-04 PROCEDURE — 99214 OFFICE O/P EST MOD 30 MIN: CPT | Performed by: FAMILY MEDICINE

## 2023-01-04 PROCEDURE — 93000 ELECTROCARDIOGRAM COMPLETE: CPT | Performed by: FAMILY MEDICINE

## 2023-01-04 ASSESSMENT — FIBROSIS 4 INDEX: FIB4 SCORE: 0.72

## 2023-01-04 ASSESSMENT — PATIENT HEALTH QUESTIONNAIRE - PHQ9: CLINICAL INTERPRETATION OF PHQ2 SCORE: 0

## 2023-01-04 NOTE — ASSESSMENT & PLAN NOTE
No associated sinus pressure, sore throat or ear pain.  Has rare productive cough from PND only.  She was using saline and flonase, but no longer using flonase.

## 2023-01-04 NOTE — PROGRESS NOTES
Subjective:     CC: low back pain and hip pain follow-up- imaging     HPI:   Janette presents today with     Abdominal and chest pain- patient went to the ER on 12/30/22 regarding this issue and states her pain has improved.  She is scheduled to see cardiology in a couple weeks.  Her BP prior to Er was 154/104.  Her home EKG monitor shows sinus with PVC and sinus with QRS prolongation intermittently.  Since leaving the ER she has noticed palpitations intermittently and HR elevates at those times.  She notices individual spikes on her home EKG monitor at the times of the spikes.  She has squeezing in her chest at times and right sided stomach pains.  No current chest pain or stomach pains. Cardiologist is Dr. Rene Moraes at [facility].  She drinks rare caffeine and has about 1 alcohol drink per week.    Gallbladder polyps- noted on US 12/30/22 in ER and patient was given a surgery referral.  She plans to scheduled surgery appt today. She is eating a low fat diet and avoiding dairy, cheese, fats, oils.  She has noticed the frequency of her abdominal discomfort has improved with this diet changes.    Low back pain- since last visit, patient had imaging that showed left synovial cyst and was therefore referred to neuro-radiology who did a left synovial cyst disruption at L4-5 and left L5 nerve root injection.  She states that her back pain has improved.  States she recently over-did it shoveling which caused only mild exacerbation.  She had a small twinge that has resolved.  No weakness or numbness.  She is able to control urine and stools.  She has occasional urine incontinence only when coughing.    She is planning to stop her OCPs again due to not liking that they cause her to have breast enlargement. She has follow-up with gyn in 1-2 months.    HLD- The 10-year ASCVD risk score (Janna DK, et al., 2019) is: 1%    Post-nasal drip  No associated sinus pressure, sore throat or ear pain.  Has rare  "productive cough from PND only.  She was using saline and flonase, but no longer using flonase.        Past Medical History:   Diagnosis Date    Advance directive in chart 08/15/2002    Arrhythmia 09/2022    PVC's    Back pain 09/29/2022    down left leg    Frequent headaches     History of intestinal parasite 1994    after [place] trip    History of pneumonia 12/2011    IBS (irritable bowel syndrome)     Morning headache     Nephrolithiasis 04/24/2014       Social History     Tobacco Use    Smoking status: Never    Smokeless tobacco: Never   Vaping Use    Vaping Use: Never used   Substance Use Topics    Alcohol use: Yes     Alcohol/week: 0.6 oz     Types: 1 Standard drinks or equivalent per week     Comment: 1per week    Drug use: Yes     Types: Marijuana, Oral     Comment: 4 per year       Current Outpatient Medications Ordered in Epic   Medication Sig Dispense Refill    ondansetron (ZOFRAN ODT) 4 MG TABLET DISPERSIBLE Take 1 Tablet by mouth every 8 hours as needed for Nausea/Vomiting. 10 Tablet 0    vitamin D3 (CHOLECALCIFEROL) 1000 Unit (25 mcg) Tab Take 1,000 Units by mouth every day.      Multiple Vitamin (MULTIVITAMIN ADULT PO) Take 1 Tablet by mouth every day.      Omega-3 Fatty Acids (FISH OIL) 1000 MG Cap capsule Take 1,000 mg by mouth every day.      metoprolol tartrate (LOPRESSOR) 25 MG Tab Take 0.5 Tablets by mouth 2 times a day. 90 Tablet 0    MAGNESIUM PO Take  by mouth.       No current Epic-ordered facility-administered medications on file.       Allergies:  Codeine    Health Maintenance: hep b immunity ordered    ROS:  Gen: no fevers/chills  ENT: no sore throat  Pulm: no sob  CV: no current cp, palpitations  GI: no nausea/vomiting      Objective:       Exam:  /82 (BP Location: Left arm, Patient Position: Sitting, BP Cuff Size: Adult long)   Pulse 72   Temp 37.1 °C (98.7 °F) (Temporal)   Ht 1.778 m (5' 10\")   Wt 87.8 kg (193 lb 7.3 oz)   SpO2 93%   BMI 27.76 kg/m²  Body mass index is " 27.76 kg/m².    Gen: Alert and oriented, No apparent distress.  HEENT: No sinus TTP.  OP with post nasal drip and trace erythema but no exudates.  No lymphadenopathy  Neck: Neck is supple without lymphadenopathy.  Lungs: Normal effort, CTA bilaterally, no wheezes, rhonchi, or rales  CV: Regular rate and rhythm. No murmurs, rubs, or gallops.  Abd Soft, nontender, nondistended. Normoactive bowel sounds  Ext: No clubbing, cyanosis, edema.    Labs: 12/30/22 labs reviewed    EKG Interpretation   Ordered and interpreted by Korin Garcia DO  Rhythm: normal sinus   Rate: normal   Axis: normal   Ectopy: none   Conduction: LAD consistent with LAFB  ST Segments: no acute change   T Waves: no acute change   Q Waves: none   Clinical Impression: no acute changes and persistent LAF block as noted in ER on 12/30/22      Assessment & Plan:     47 y.o. female with the following -     1. Palpitations  Chronic, intermittent, more frequent since last visit but no acute symptoms during her exam today. She has hx of PVCs and these are likely due to PVCs, however will check labs and Holter to confirm and pt to see cardiology as scheduled.  Recommended she stop having caffeine and alcohol.  Continue current medications.  ER precautions given.  - TSH WITH REFLEX TO FT4; Future  - HOLTER - Cardiology Performed (48HR); Future  - EKG    2. Gallbladder polyp  New issue, intermittent RUQ discomfort is improving with diet.  Continue dietary modifications and recommended she establish with surgeon to discuss having gallbladder removed.    3. Immunity status testing  No hep b immunity status testing on file.  Will check immunity level and provide vaccine if not immune.  - HEP B SURFACE AB; Future    4. Post-nasal drip  Chronic issue s/p recent sinus infection and COVID19.  Pt has no associated fevers, sore throat, lymph node swelling and has rare cough from the PND.  Unlikely due to strep given her PE and symptoms.  Recommended nasal saline and  Flonase 1-2 times per day.  Follow-up precautions given      Return in about 6 weeks (around 2/15/2023) for palpitations and gallbladder follow-up.    Please note that this dictation was created using voice recognition software. I have made every reasonable attempt to correct obvious errors, but I expect that there are errors of grammar and possibly content that I did not discover before finalizing the note.

## 2023-01-09 ENCOUNTER — NON-PROVIDER VISIT (OUTPATIENT)
Dept: CARDIOLOGY | Facility: MEDICAL CENTER | Age: 48
End: 2023-01-09
Attending: FAMILY MEDICINE
Payer: COMMERCIAL

## 2023-01-09 DIAGNOSIS — I47.10 SVT (SUPRAVENTRICULAR TACHYCARDIA) (HCC): ICD-10-CM

## 2023-01-09 DIAGNOSIS — I49.1 APC (ATRIAL PREMATURE CONTRACTIONS): ICD-10-CM

## 2023-01-09 DIAGNOSIS — R00.2 PALPITATIONS: ICD-10-CM

## 2023-01-09 DIAGNOSIS — I49.3 PVC'S (PREMATURE VENTRICULAR CONTRACTIONS): ICD-10-CM

## 2023-01-09 NOTE — PROGRESS NOTES
Patient enrolled in the 14 day ePatch Holter monitoring program, per Korin Garcia D.O.  In clinic hook up, monitor S/N [id number].  >Pending EOS.

## 2023-01-11 ENCOUNTER — OFFICE VISIT (OUTPATIENT)
Dept: SURGICAL ONCOLOGY | Facility: MEDICAL CENTER | Age: 48
End: 2023-01-11
Payer: COMMERCIAL

## 2023-01-11 VITALS
SYSTOLIC BLOOD PRESSURE: 106 MMHG | DIASTOLIC BLOOD PRESSURE: 62 MMHG | WEIGHT: 194 LBS | BODY MASS INDEX: 27.84 KG/M2 | TEMPERATURE: 97.6 F | OXYGEN SATURATION: 93 % | HEART RATE: 83 BPM

## 2023-01-11 DIAGNOSIS — K82.4 GALLBLADDER POLYP: ICD-10-CM

## 2023-01-11 DIAGNOSIS — K80.50 BILIARY COLIC: ICD-10-CM

## 2023-01-11 PROCEDURE — 99203 OFFICE O/P NEW LOW 30 MIN: CPT | Performed by: SURGERY

## 2023-01-11 ASSESSMENT — ENCOUNTER SYMPTOMS
RESPIRATORY NEGATIVE: 1
ABDOMINAL PAIN: 1
EYES NEGATIVE: 1
DIARRHEA: 1
CONSTIPATION: 0
NAUSEA: 0
VOMITING: 0
CONSTITUTIONAL NEGATIVE: 1
CARDIOVASCULAR NEGATIVE: 1

## 2023-01-11 ASSESSMENT — FIBROSIS 4 INDEX: FIB4 SCORE: 0.72

## 2023-01-11 NOTE — H&P
Subjective:   1/11/2023  3:29 PM  Primary care physician: Korin Garcia D.O.    Chief Complaint:   Chief Complaint   Patient presents with    Abdominal Pain     Diagnosis:   1. Gallbladder polyp        2. Biliary colic            History of presenting illness:  Janette Swan is a pleasant 47 y.o. female presented to the emergency room last week with epigastric pain radiating to her back.  Work-up included an ultrasound which showed question of multiple small polyps in her gallbladder without gallbladder wall thickening.  Liver function tests were normal.  Common duct diameter was normal as well.  The patient does not think she has had pain like this in the past but she does have irritable bowel syndrome.  The pain lasted about 3 hours and then dissipated.  She has been eating a low-fat diet and has been doing well without trouble.    Past Medical History:   Diagnosis Date    Advance directive in chart 08/15/2002    Arrhythmia 09/2022    PVC's    Back pain 09/29/2022    down left leg    Frequent headaches     History of intestinal parasite 1994    after [place] trip    History of pneumonia 12/2011    IBS (irritable bowel syndrome)     Morning headache     Nephrolithiasis 04/24/2014     Past Surgical History:   Procedure Laterality Date    TONSILLECTOMY  1995    TURBINECTOMY       Allergies   Allergen Reactions    Codeine Vomiting     Outpatient Encounter Medications as of 1/11/2023   Medication Sig Dispense Refill    ondansetron (ZOFRAN ODT) 4 MG TABLET DISPERSIBLE Take 1 Tablet by mouth every 8 hours as needed for Nausea/Vomiting. 10 Tablet 0    vitamin D3 (CHOLECALCIFEROL) 1000 Unit (25 mcg) Tab Take 1,000 Units by mouth every day.      Multiple Vitamin (MULTIVITAMIN ADULT PO) Take 1 Tablet by mouth every day.      Omega-3 Fatty Acids (FISH OIL) 1000 MG Cap capsule Take 1,000 mg by mouth every day.      metoprolol tartrate (LOPRESSOR) 25 MG Tab Take 0.5 Tablets by mouth 2 times a day. 90 Tablet 0    MAGNESIUM PO  Take  by mouth.       No facility-administered encounter medications on file as of 1/11/2023.     Social History     Socioeconomic History    Marital status:      Spouse name: Not on file    Number of children: Not on file    Years of education: Not on file    Highest education level: Not on file   Occupational History    Not on file   Tobacco Use    Smoking status: Never    Smokeless tobacco: Never   Vaping Use    Vaping Use: Never used   Substance and Sexual Activity    Alcohol use: Yes     Alcohol/week: 0.6 oz     Types: 1 Standard drinks or equivalent per week     Comment: 1per week    Drug use: Yes     Types: Marijuana, Oral     Comment: 4 per year    Sexual activity: Yes     Partners: Male     Comment: vasectomy   Other Topics Concern     Service No    Blood Transfusions No    Caffeine Concern No    Occupational Exposure No    Hobby Hazards Yes    Sleep Concern Yes    Stress Concern No    Weight Concern No    Special Diet No    Back Care No    Exercise Yes    Bike Helmet Yes    Seat Belt Yes    Self-Exams No   Social History Narrative    Not on file     Social Determinants of Health     Financial Resource Strain: Not on file   Food Insecurity: Not on file   Transportation Needs: Not on file   Physical Activity: Not on file   Stress: Not on file   Social Connections: Not on file   Intimate Partner Violence: Not on file   Housing Stability: Not on file      Social History     Tobacco Use   Smoking Status Never   Smokeless Tobacco Never     Social History     Substance and Sexual Activity   Alcohol Use Yes    Alcohol/week: 0.6 oz    Types: 1 Standard drinks or equivalent per week    Comment: 1per week     Social History     Substance and Sexual Activity   Drug Use Yes    Types: Marijuana, Oral    Comment: 4 per year      Family History   Problem Relation Age of Onset    Hypertension Mother     Hyperlipidemia Mother     Heart Disease Mother 77        MI    Sleep Apnea Mother     Hypertension Father      Hyperlipidemia Father     Other Father         Alzheimers    Sleep Apnea Father     Diabetes Other          Review of Systems   Constitutional: Negative.    HENT: Negative.     Eyes: Negative.    Respiratory: Negative.     Cardiovascular: Negative.    Gastrointestinal:  Positive for abdominal pain and diarrhea. Negative for constipation, nausea and vomiting.   Skin: Negative.       Objective:   /62 (BP Location: Right arm, Patient Position: Sitting, BP Cuff Size: Adult)   Pulse 83   Temp 36.4 °C (97.6 °F) (Temporal)   Wt 88 kg (194 lb)   SpO2 93%   BMI 27.84 kg/m²     Physical Exam  Constitutional:       Appearance: Normal appearance.   HENT:      Head: Normocephalic and atraumatic.   Eyes:      General: No scleral icterus.     Extraocular Movements: Extraocular movements intact.      Conjunctiva/sclera: Conjunctivae normal.      Pupils: Pupils are equal, round, and reactive to light.   Cardiovascular:      Rate and Rhythm: Normal rate and regular rhythm.   Pulmonary:      Effort: Pulmonary effort is normal.      Breath sounds: Normal breath sounds.   Abdominal:      General: Abdomen is flat. There is no distension.      Palpations: Abdomen is soft.      Tenderness: There is no abdominal tenderness.   Neurological:      Mental Status: She is alert.         Imaging  IMPRESSION:     1.  Multiple gallbladder polyps measuring up to 4 mm in size.     2.  No gallstones or biliary ductal dilatation.     3.  The liver is echogenic consistent with fatty change versus hepatocellular dysfunction.          Diagnosis:     1. Gallbladder polyp        2. Biliary colic            Medical Decision Making:  Today's Assessment / Status / Plan:     Patient likely has biliary colic secondary to cholesterolosis and small stones in her gallbladder.  Small polyps are possible but much less likely.  Her symptoms from her emergency room visit are quite classic for biliary colic.    I recommended laparoscopic cholecystectomy.   The patient would like to get through ski season and see how she does eating a low-fat diet which I think is quite reasonable.  She will come back and see me in April at which time we will discuss things further.  We did discuss the symptoms of acute cholecystitis or recurrent attacks of biliary colic and she will seek care promptly should she experience any of those.    Greater than 30 minutes were spent with the patient.  This included review of outside records and imaging, counseling of the patinet and coordination of care.

## 2023-01-21 LAB
HBV SURFACE AB SER QL: NON REACTIVE
TSH SERPL DL<=0.005 MIU/L-ACNC: 1.57 UIU/ML (ref 0.45–4.5)

## 2023-01-23 ENCOUNTER — TELEPHONE (OUTPATIENT)
Dept: CARDIOLOGY | Facility: MEDICAL CENTER | Age: 48
End: 2023-01-23
Payer: COMMERCIAL

## 2023-01-23 ENCOUNTER — PATIENT MESSAGE (OUTPATIENT)
Dept: MEDICAL GROUP | Facility: MEDICAL CENTER | Age: 48
End: 2023-01-23
Payer: COMMERCIAL

## 2023-02-08 PROCEDURE — 93228 REMOTE 30 DAY ECG REV/REPORT: CPT | Performed by: INTERNAL MEDICINE

## 2023-02-14 NOTE — PROGRESS NOTES
Subjective:     CC: palpitations and gallbladder polyp follow-up    HPI:   Janette presents today with     Palpitations- states she has stopped alcohol and caffeine and her palpitations have resolved.  She has been skiing and snowboarding without chest pain or palpitations.  She is walking daily without symptoms.  Pt has upcoming cardiology appt 4/5/23    She has stopped OCPs and has found that her breast enlargement has resolved.    Gallbladder polyp- he has seen a surgeon and they plan for possible surgery in April after ski season.  She has had no RUQ pain since 1 week after last ER visit.  She is eating low fat diet.  She has mucous in her stools after biliary colic.  No longer having mucous in stools.  No blood in stools.    Post nasal drip- still present.  She has been using nasal saline but not flonase.  Nose feels dry.  No fevers, chills, sore throat.  No cough.    Hep B- states she did not get hep B vaccines as child.    She had recent genetic screening that showed that she may be at risk for hemochromatosis.    Abnormal micro urinalysis- she had a slightly amount of blood noted on UA 12/2022.  She has been having no urinary urgency, frequency, or dysuria.  No visible blood in urine.  Both her parents had renal disease but no renal cancers.  She has had ovulatory discomfort since stopping OCPs, but no suprapubic discomfort.    Past Medical History:   Diagnosis Date    Advance directive in chart 08/15/2002    Arrhythmia 09/2022    PVC's    Back pain 09/29/2022    down left leg    Frequent headaches     History of intestinal parasite 1994    after [place] trip    History of pneumonia 12/2011    IBS (irritable bowel syndrome)     Morning headache     Nephrolithiasis 04/24/2014       Social History     Tobacco Use    Smoking status: Never    Smokeless tobacco: Never   Vaping Use    Vaping Use: Never used   Substance Use Topics    Alcohol use: Yes     Alcohol/week: 0.6 oz     Types: 1 Standard drinks or equivalent  "per week     Comment: 1per week    Drug use: Yes     Types: Marijuana, Oral     Comment: 4 per year       Current Outpatient Medications Ordered in Epic   Medication Sig Dispense Refill    ondansetron (ZOFRAN ODT) 4 MG TABLET DISPERSIBLE Take 1 Tablet by mouth every 8 hours as needed for Nausea/Vomiting. 10 Tablet 0    vitamin D3 (CHOLECALCIFEROL) 1000 Unit (25 mcg) Tab Take 1,000 Units by mouth every day.      Multiple Vitamin (MULTIVITAMIN ADULT PO) Take 1 Tablet by mouth every day.      Omega-3 Fatty Acids (FISH OIL) 1000 MG Cap capsule Take 1,000 mg by mouth every day.      metoprolol tartrate (LOPRESSOR) 25 MG Tab Take 0.5 Tablets by mouth 2 times a day. 90 Tablet 0    MAGNESIUM PO Take  by mouth.       No current Epic-ordered facility-administered medications on file.       Allergies:  Codeine    ROS:  Gen: no fevers/chills, no changes in weight  Eyes: no changes in vision  ENT: no sore throat, no hearing loss, no bloody nose  Pulm: no sob, no cough  CV: no chest pain, no palpitations  GI: no nausea/vomiting, no diarrhea  : no dysuria      Objective:       Exam:  /82 (BP Location: Left arm, Patient Position: Sitting, BP Cuff Size: Adult long)   Pulse 66   Temp 36.8 °C (98.2 °F) (Temporal)   Resp 20   Ht 1.778 m (5' 10\")   Wt 86.1 kg (189 lb 14.8 oz)   SpO2 95%   BMI 27.25 kg/m²  Body mass index is 27.25 kg/m².    Gen: Alert and oriented, No apparent distress.  Neck: Neck is supple without lymphadenopathy.  Lungs: Normal effort, CTA bilaterally, no wheezes, rhonchi, or rales  CV: Regular rate and rhythm. No murmurs, rubs, or gallops.  Abd: Soft, nontender, non distended. Normoactive bowel sounds.  Ext: No clubbing, cyanosis, edema.    Labs: 12/30/22-1/19/23 labs reviewed with patient    Assessment & Plan:     47 y.o. female with the following -     1. Need for vaccination  Pt desires vaccination.  Risks and benefits discussed.  No contraindications today.  Vaccine given.  Follow-up precautions " discussed.  Patient to return in 1 months and 6 months for the next 2 doses.  - Hepatitis B Vaccine Adult 20+    2. Hematuria, unspecified type  Acute, unclear duration.  She is asymptomatic.  Her urinalysis on 12/30/22 showed trace blood with 2-5 red blood cells on micro testing.  We will check urinalysis with plan for culture if positive.  - URINALYSIS,CULTURE IF INDICATED; Future    3. Gallbladder polyp  Chronic, asymptomatic.  Continue dietary and lifestyle modifications and surgery follow-up.    4. Abnormal CBC  Patient had mild decrease in MPV and elevation of lymphocytes on recent CBC.  Of note the lymphocyte count is improving compared to previous.  Given her recent genetic screening showing that she has a variant that could possibly increase her risk for hemochromatosis, will check labs per below.  - CBC WITH DIFFERENTIAL; Future  - IRON/TOTAL IRON BIND; Future  - FERRITIN; Future    5. Palpitations  Chronic, improved.  Continue dietary and lifestyle modifications and pt to see cardiology as scheduled on 4/5/23.  Earlier follow-up precautions given.        Return in about 4 weeks (around 3/15/2023) for hep B #2, return in 6 months for hep B #3.  Establish care with new PCP in 3 months. Since Dr. Garcia is transferring.    Please note that this dictation was created using voice recognition software. I have made every reasonable attempt to correct obvious errors, but I expect that there are errors of grammar and possibly content that I did not discover before finalizing the note.

## 2023-02-15 ENCOUNTER — OFFICE VISIT (OUTPATIENT)
Dept: MEDICAL GROUP | Facility: MEDICAL CENTER | Age: 48
End: 2023-02-15
Payer: COMMERCIAL

## 2023-02-15 VITALS
DIASTOLIC BLOOD PRESSURE: 82 MMHG | TEMPERATURE: 98.2 F | OXYGEN SATURATION: 95 % | RESPIRATION RATE: 20 BRPM | HEIGHT: 70 IN | HEART RATE: 66 BPM | WEIGHT: 189.93 LBS | SYSTOLIC BLOOD PRESSURE: 128 MMHG | BODY MASS INDEX: 27.19 KG/M2

## 2023-02-15 DIAGNOSIS — Z23 NEED FOR VACCINATION: ICD-10-CM

## 2023-02-15 DIAGNOSIS — R31.9 HEMATURIA, UNSPECIFIED TYPE: ICD-10-CM

## 2023-02-15 DIAGNOSIS — R79.89 ABNORMAL CBC: ICD-10-CM

## 2023-02-15 DIAGNOSIS — R00.2 PALPITATIONS: ICD-10-CM

## 2023-02-15 DIAGNOSIS — K82.4 GALLBLADDER POLYP: ICD-10-CM

## 2023-02-15 PROCEDURE — 90471 IMMUNIZATION ADMIN: CPT | Performed by: FAMILY MEDICINE

## 2023-02-15 PROCEDURE — 90746 HEPB VACCINE 3 DOSE ADULT IM: CPT | Performed by: FAMILY MEDICINE

## 2023-02-15 PROCEDURE — 99214 OFFICE O/P EST MOD 30 MIN: CPT | Mod: 25 | Performed by: FAMILY MEDICINE

## 2023-02-15 ASSESSMENT — FIBROSIS 4 INDEX: FIB4 SCORE: 0.72

## 2023-03-08 ENCOUNTER — TELEPHONE (OUTPATIENT)
Dept: CARDIOLOGY | Facility: MEDICAL CENTER | Age: 48
End: 2023-03-08
Payer: COMMERCIAL

## 2023-03-08 NOTE — TELEPHONE ENCOUNTER
Chart prep:    Spoke with patient. She had a stress test done at [facility] a few years ago.    Faxed STAT request     Fax# [phone]

## 2023-03-27 ENCOUNTER — NON-PROVIDER VISIT (OUTPATIENT)
Dept: MEDICAL GROUP | Facility: MEDICAL CENTER | Age: 48
End: 2023-03-27
Payer: COMMERCIAL

## 2023-03-27 DIAGNOSIS — Z23 NEED FOR VACCINATION: ICD-10-CM

## 2023-03-27 PROCEDURE — 90746 HEPB VACCINE 3 DOSE ADULT IM: CPT | Performed by: FAMILY MEDICINE

## 2023-03-27 PROCEDURE — 90471 IMMUNIZATION ADMIN: CPT | Performed by: FAMILY MEDICINE

## 2023-03-27 NOTE — PROGRESS NOTES
"Janette Swan is a 47 y.o. female here for a non-provider visit for:   HEPATITIS B 2 of 2    Reason for immunization: lack of immunity demonstrated on prior labs or testing  Immunization records indicate need for vaccine: Yes, confirmed with [facility]  Minimum interval has been met for this vaccine: Yes  ABN completed: Not Indicated    VIS Dated  10/15/21 was given to patient: Yes  All IAC Questionnaire questions were answered \"No.\"    Patient tolerated injection and no adverse effects were observed or reported: Yes    Pt scheduled for next dose in series: No    "

## 2023-04-05 ENCOUNTER — TELEMEDICINE (OUTPATIENT)
Dept: CARDIOLOGY | Facility: MEDICAL CENTER | Age: 48
End: 2023-04-05
Payer: COMMERCIAL

## 2023-04-05 VITALS
HEART RATE: 58 BPM | DIASTOLIC BLOOD PRESSURE: 77 MMHG | WEIGHT: 185 LBS | HEIGHT: 70 IN | BODY MASS INDEX: 26.48 KG/M2 | SYSTOLIC BLOOD PRESSURE: 119 MMHG

## 2023-04-05 DIAGNOSIS — E78.2 MIXED HYPERLIPIDEMIA: ICD-10-CM

## 2023-04-05 DIAGNOSIS — I49.3 FREQUENT PVCS: ICD-10-CM

## 2023-04-05 DIAGNOSIS — Z82.49 FAMILY HISTORY OF EARLY CAD: ICD-10-CM

## 2023-04-05 DIAGNOSIS — I49.3 PVC'S (PREMATURE VENTRICULAR CONTRACTIONS): ICD-10-CM

## 2023-04-05 PROCEDURE — 99203 OFFICE O/P NEW LOW 30 MIN: CPT | Mod: 95 | Performed by: INTERNAL MEDICINE

## 2023-04-05 ASSESSMENT — FIBROSIS 4 INDEX: FIB4 SCORE: 0.72

## 2023-04-05 NOTE — PROGRESS NOTES
Chief Complaint   Patient presents with    Premature Ventricular Contractions (PVCs)     Virtual new patient       Subjective     Janette Swan is a 47 y.o. female who presents today for initial consultation regarding PVCs.  Previous patient of  In [place].  Does not smoke drink excessively uses marijuana occasionally.  Family history of coronary artery disease and stroke.  Hyperlipidemia.  Active healthy eater no symptoms.    Past Medical History:   Diagnosis Date    Advance directive in chart 08/15/2002    Arrhythmia 09/2022    PVC's    Back pain 09/29/2022    down left leg    Frequent headaches     History of intestinal parasite 1994    after [place] trip    History of pneumonia 12/2011    IBS (irritable bowel syndrome)     Morning headache     Nephrolithiasis 04/24/2014     Past Surgical History:   Procedure Laterality Date    TONSILLECTOMY  1995    TURBINECTOMY       Family History   Problem Relation Age of Onset    Hypertension Mother     Hyperlipidemia Mother     Heart Disease Mother 77        MI    Sleep Apnea Mother     Hypertension Father     Hyperlipidemia Father     Other Father         Alzheimers    Sleep Apnea Father     Diabetes Other      Social History     Socioeconomic History    Marital status:      Spouse name: Not on file    Number of children: Not on file    Years of education: Not on file    Highest education level: Not on file   Occupational History    Not on file   Tobacco Use    Smoking status: Never    Smokeless tobacco: Never   Vaping Use    Vaping Use: Never used   Substance and Sexual Activity    Alcohol use: Yes     Alcohol/week: 0.6 oz     Types: 1 Standard drinks or equivalent per week     Comment: 1per week    Drug use: Yes     Types: Marijuana, Oral     Comment: 4 per year    Sexual activity: Yes     Partners: Male     Comment: vasectomy   Other Topics Concern     Service No    Blood Transfusions No    Caffeine Concern No    Occupational Exposure No     "Hobby Hazards Yes    Sleep Concern Yes    Stress Concern No    Weight Concern No    Special Diet No    Back Care No    Exercise Yes    Bike Helmet Yes    Seat Belt Yes    Self-Exams No   Social History Narrative    Not on file     Social Determinants of Health     Financial Resource Strain: Not on file   Food Insecurity: Not on file   Transportation Needs: Not on file   Physical Activity: Not on file   Stress: Not on file   Social Connections: Not on file   Intimate Partner Violence: Not on file   Housing Stability: Not on file     Allergies   Allergen Reactions    Codeine Vomiting     Outpatient Encounter Medications as of 4/5/2023   Medication Sig Dispense Refill    TURMERIC PO Take  by mouth every 7 days.      vitamin D3 (CHOLECALCIFEROL) 1000 Unit (25 mcg) Tab Take 1,000 Units by mouth every day.      Multiple Vitamin (MULTIVITAMIN ADULT PO) Take 1 Tablet by mouth every day.      Omega-3 Fatty Acids (FISH OIL) 1000 MG Cap capsule Take 1,000 mg by mouth every day.      metoprolol tartrate (LOPRESSOR) 25 MG Tab Take 0.5 Tablets by mouth 2 times a day. 90 Tablet 0    MAGNESIUM PO Take  by mouth.      ondansetron (ZOFRAN ODT) 4 MG TABLET DISPERSIBLE Take 1 Tablet by mouth every 8 hours as needed for Nausea/Vomiting. (Patient not taking: Reported on 4/5/2023) 10 Tablet 0     No facility-administered encounter medications on file as of 4/5/2023.     Review of Systems   All other systems reviewed and are negative.           Objective     /77 (BP Location: Left arm, Patient Position: Supine)   Pulse (!) 58   Ht 1.778 m (5' 10\")   Wt 83.9 kg (185 lb)   BMI 26.54 kg/m²     Physical Exam  Vitals and nursing note reviewed.   Constitutional:       General: She is not in acute distress.     Appearance: She is well-developed. She is not diaphoretic.   HENT:      Head: Normocephalic and atraumatic.      Mouth/Throat:      Pharynx: No oropharyngeal exudate.   Eyes:      General: No scleral icterus.     " Conjunctiva/sclera: Conjunctivae normal.      Pupils: Pupils are equal, round, and reactive to light.   Neck:      Vascular: No JVD.   Pulmonary:      Effort: Pulmonary effort is normal. No respiratory distress.      Breath sounds: No stridor.   Skin:     General: Skin is dry.      Coloration: Skin is not pale.      Findings: No erythema or rash.   Neurological:      Mental Status: She is alert and oriented to person, place, and time.      Cranial Nerves: No cranial nerve deficit.   Psychiatric:         Behavior: Behavior normal.         Thought Content: Thought content normal.         Judgment: Judgment normal.     LABS:  Lab Results   Component Value Date/Time    CHOLSTRLTOT 226 (H) 04/13/2022 08:15 AM     (H) 04/13/2022 08:15 AM    HDL 62 04/13/2022 08:15 AM    TRIGLYCERIDE 111 04/13/2022 08:15 AM       Lab Results   Component Value Date/Time    WBC 10.8 12/30/2022 03:30 AM    RBC 4.72 12/30/2022 03:30 AM    HEMOGLOBIN 13.8 12/30/2022 03:30 AM    HEMATOCRIT 40.8 12/30/2022 03:30 AM    MCV 86.4 12/30/2022 03:30 AM    NEUTSPOLYS 48.30 12/30/2022 03:30 AM    LYMPHOCYTES 44.20 (H) 12/30/2022 03:30 AM    MONOCYTES 5.40 12/30/2022 03:30 AM    EOSINOPHILS 1.40 12/30/2022 03:30 AM    BASOPHILS 0.30 12/30/2022 03:30 AM     Lab Results   Component Value Date/Time    SODIUM 137 12/30/2022 03:30 AM    POTASSIUM 3.9 12/30/2022 03:30 AM    CHLORIDE 103 12/30/2022 03:30 AM    CO2 21 12/30/2022 03:30 AM    GLUCOSE 103 (H) 12/30/2022 03:30 AM    BUN 6 (L) 12/30/2022 03:30 AM    CREATININE 0.57 12/30/2022 03:30 AM    BUNCREATRAT 7 (L) 05/09/2014 09:01 AM     Lab Results   Component Value Date    HBA1C 5.3 04/13/2022      Lab Results   Component Value Date/Time    ALKPHOSPHAT 40 12/30/2022 03:30 AM    ASTSGOT 20 12/30/2022 03:30 AM    ALTSGPT 15 12/30/2022 03:30 AM    TBILIRUBIN <0.2 12/30/2022 03:30 AM      No results found for: BNPBTYPENAT   Lab Results   Component Value Date/Time    TSH 1.570 01/20/2023 04:10 AM    TSH  2.040 05/09/2014 09:01 AM     No results found for: PROTHROMBTM, INR              Assessment & Plan     1. PVC's (premature ventricular contractions)  EC-ECHOCARDIOGRAM COMPLETE W/O CONT      2. Frequent PVCs        3. Family history of early CAD  CT-CARDIAC SCORING      4. Mixed hyperlipidemia  Comp Metabolic Panel    Lipid Profile          Medical Decision Making: Today's Assessment/Status/Plan:          Hyperlipidemia.  Strong family history of coronary artery disease.  Recommend CT coronary calcium scoring to help her stratify and optimize treatment.  Echocardiogram for PVCs.  CMP and lipid profile.  Follow-up after testing.    This evaluation was conducted via Zoom using secure and encrypted videoconferencing technology. The patient was in their home in the [facility].    The patient's identity was confirmed and verbal consent was obtained for this virtual visit.

## 2023-04-07 ENCOUNTER — HOSPITAL ENCOUNTER (OUTPATIENT)
Dept: RADIOLOGY | Facility: MEDICAL CENTER | Age: 48
End: 2023-04-07
Attending: INTERNAL MEDICINE
Payer: COMMERCIAL

## 2023-04-07 DIAGNOSIS — Z82.49 FAMILY HISTORY OF EARLY CAD: ICD-10-CM

## 2023-04-07 PROCEDURE — 4410556 CT-CARDIAC SCORING (SELF PAY ONLY)

## 2023-04-08 LAB
ALBUMIN SERPL-MCNC: 4.6 G/DL (ref 3.8–4.8)
ALBUMIN/GLOB SERPL: 2.1 {RATIO} (ref 1.2–2.2)
ALP SERPL-CCNC: 43 IU/L (ref 44–121)
ALT SERPL-CCNC: 14 IU/L (ref 0–32)
AST SERPL-CCNC: 16 IU/L (ref 0–40)
BASOPHILS # BLD AUTO: 0 X10E3/UL (ref 0–0.2)
BASOPHILS NFR BLD AUTO: 0 %
BILIRUB SERPL-MCNC: 0.7 MG/DL (ref 0–1.2)
BUN SERPL-MCNC: 7 MG/DL (ref 6–24)
BUN/CREAT SERPL: 9 (ref 9–23)
CALCIUM SERPL-MCNC: 9.7 MG/DL (ref 8.7–10.2)
CHLORIDE SERPL-SCNC: 105 MMOL/L (ref 96–106)
CHOLEST SERPL-MCNC: 229 MG/DL (ref 100–199)
CO2 SERPL-SCNC: 23 MMOL/L (ref 20–29)
CREAT SERPL-MCNC: 0.8 MG/DL (ref 0.57–1)
EGFRCR SERPLBLD CKD-EPI 2021: 90 ML/MIN/1.73
EOSINOPHIL # BLD AUTO: 0.1 X10E3/UL (ref 0–0.4)
EOSINOPHIL NFR BLD AUTO: 1 %
ERYTHROCYTE [DISTWIDTH] IN BLOOD BY AUTOMATED COUNT: 12.7 % (ref 11.7–15.4)
FERRITIN SERPL-MCNC: 55 NG/ML (ref 15–150)
GLOBULIN SER CALC-MCNC: 2.2 G/DL (ref 1.5–4.5)
GLUCOSE SERPL-MCNC: 94 MG/DL (ref 70–99)
HCT VFR BLD AUTO: 45.2 % (ref 34–46.6)
HDLC SERPL-MCNC: 46 MG/DL
HGB BLD-MCNC: 14.8 G/DL (ref 11.1–15.9)
IMM GRANULOCYTES # BLD AUTO: 0 X10E3/UL (ref 0–0.1)
IMM GRANULOCYTES NFR BLD AUTO: 0 %
IMMATURE CELLS  115398: ABNORMAL
IRON SATN MFR SERPL: 45 % (ref 15–55)
IRON SERPL-MCNC: 158 UG/DL (ref 27–159)
LABORATORY COMMENT REPORT: ABNORMAL
LDLC SERPL CALC-MCNC: 160 MG/DL (ref 0–99)
LYMPHOCYTES # BLD AUTO: 3.7 X10E3/UL (ref 0.7–3.1)
LYMPHOCYTES NFR BLD AUTO: 48 %
MCH RBC QN AUTO: 29.1 PG (ref 26.6–33)
MCHC RBC AUTO-ENTMCNC: 32.7 G/DL (ref 31.5–35.7)
MCV RBC AUTO: 89 FL (ref 79–97)
MONOCYTES # BLD AUTO: 0.5 X10E3/UL (ref 0.1–0.9)
MONOCYTES NFR BLD AUTO: 6 %
MORPHOLOGY BLD-IMP: ABNORMAL
NEUTROPHILS # BLD AUTO: 3.5 X10E3/UL (ref 1.4–7)
NEUTROPHILS NFR BLD AUTO: 45 %
NRBC BLD AUTO-RTO: ABNORMAL %
PLATELET # BLD AUTO: 342 X10E3/UL (ref 150–450)
POTASSIUM SERPL-SCNC: 4.3 MMOL/L (ref 3.5–5.2)
PROT SERPL-MCNC: 6.8 G/DL (ref 6–8.5)
RBC # BLD AUTO: 5.09 X10E6/UL (ref 3.77–5.28)
SODIUM SERPL-SCNC: 140 MMOL/L (ref 134–144)
TIBC SERPL-MCNC: 354 UG/DL (ref 250–450)
TRIGL SERPL-MCNC: 129 MG/DL (ref 0–149)
UIBC SERPL-MCNC: 196 UG/DL (ref 131–425)
VLDLC SERPL CALC-MCNC: 23 MG/DL (ref 5–40)
WBC # BLD AUTO: 7.8 X10E3/UL (ref 3.4–10.8)

## 2023-04-09 ENCOUNTER — PATIENT MESSAGE (OUTPATIENT)
Dept: CARDIOLOGY | Facility: MEDICAL CENTER | Age: 48
End: 2023-04-09
Payer: COMMERCIAL

## 2023-04-18 ENCOUNTER — HOSPITAL ENCOUNTER (OUTPATIENT)
Dept: CARDIOLOGY | Facility: MEDICAL CENTER | Age: 48
End: 2023-04-18
Attending: INTERNAL MEDICINE
Payer: COMMERCIAL

## 2023-04-18 DIAGNOSIS — I49.3 PVC'S (PREMATURE VENTRICULAR CONTRACTIONS): ICD-10-CM

## 2023-04-18 PROCEDURE — 93306 TTE W/DOPPLER COMPLETE: CPT

## 2023-04-20 ENCOUNTER — PATIENT MESSAGE (OUTPATIENT)
Dept: CARDIOLOGY | Facility: MEDICAL CENTER | Age: 48
End: 2023-04-20
Payer: COMMERCIAL

## 2023-04-20 ENCOUNTER — PATIENT MESSAGE (OUTPATIENT)
Dept: CARDIOLOGY | Facility: MEDICAL CENTER | Age: 48
End: 2023-04-20

## 2023-04-20 LAB
LV EJECT FRACT  99904: 65
LV EJECT FRACT MOD 2C 99903: 70.59
LV EJECT FRACT MOD 4C 99902: 63.37
LV EJECT FRACT MOD BP 99901: 65.96

## 2023-04-20 PROCEDURE — 93306 TTE W/DOPPLER COMPLETE: CPT | Mod: 26 | Performed by: INTERNAL MEDICINE

## 2023-04-20 NOTE — PATIENT COMMUNICATION
TW: Following up- please review and advise on statin. Follow up visit to discuss? Patient recent Telemed visit 4/5/23. Labs in chart dated 4/6/23. Thank you.

## 2023-04-21 NOTE — PATIENT COMMUNICATION
Noted below from TW:  Received: Today  Rajesh Hall M.D.  ZOFIA Leo.With a completely normal calcium score which is excellent news suggest diet lifestyle modifications for her hyperlipidemia and would defer statin therapy for the time being.  Her echocardiogram also looks excellent.     MyChart to patient

## 2023-04-27 ENCOUNTER — TELEPHONE (OUTPATIENT)
Dept: SURGICAL ONCOLOGY | Facility: MEDICAL CENTER | Age: 48
End: 2023-04-27
Payer: COMMERCIAL

## 2023-04-27 NOTE — TELEPHONE ENCOUNTER
0835 04/27/23  Attempted to contact pt regarding a follow up with Dr. Graves. [facility] for pt to call back.  Amna PAGE

## 2023-05-08 NOTE — PROGRESS NOTES
Subjective:      Primary care physician: Korin Garcia D.O.      Chief Complaint:? Gallbladder polyp    Diagnosis:   1. Gallbladder polyp        2. Biliary colic        3. Abdominal pain, unspecified abdominal location            History of presenting illness:  Janette Swan is a pleasant 47 y.o. female presented to the emergency room with epigastric pain radiating to her back.  Work-up included an ultrasound which showed question of multiple small polyps in her gallbladder without gallbladder wall thickening.  Liver function tests were normal.  Common duct diameter was normal as well.  The patient does not think she has had pain like this in the past but she does have irritable bowel syndrome.  The pain lasted about 3 hours and then dissipated.  She has been eating a low-fat diet and has been doing well without trouble.    Update 5/16/2023  The patient is doing well.  Since I saw her last she has had no pain at all.  She is reintroduce fatty foods back into her diet without trouble.    Past Medical History:   Diagnosis Date    Advance directive in chart 08/15/2002    Arrhythmia 09/2022    PVC's    Back pain 09/29/2022    down left leg    Frequent headaches     History of intestinal parasite 1994    after [place] trip    History of pneumonia 12/2011    IBS (irritable bowel syndrome)     Morning headache     Nephrolithiasis 04/24/2014     Past Surgical History:   Procedure Laterality Date    TONSILLECTOMY  1995    TURBINECTOMY       Allergies   Allergen Reactions    Codeine Vomiting     Outpatient Encounter Medications as of 5/16/2023   Medication Sig Dispense Refill    TURMERIC PO Take  by mouth every 7 days.      ondansetron (ZOFRAN ODT) 4 MG TABLET DISPERSIBLE Take 1 Tablet by mouth every 8 hours as needed for Nausea/Vomiting. (Patient not taking: Reported on 4/5/2023) 10 Tablet 0    vitamin D3 (CHOLECALCIFEROL) 1000 Unit (25 mcg) Tab Take 1,000 Units by mouth every day.      Multiple Vitamin (MULTIVITAMIN  ADULT PO) Take 1 Tablet by mouth every day.      Omega-3 Fatty Acids (FISH OIL) 1000 MG Cap capsule Take 1,000 mg by mouth every day.      metoprolol tartrate (LOPRESSOR) 25 MG Tab Take 0.5 Tablets by mouth 2 times a day. 90 Tablet 0    MAGNESIUM PO Take  by mouth.       No facility-administered encounter medications on file as of 5/16/2023.     Social History     Socioeconomic History    Marital status:      Spouse name: Not on file    Number of children: Not on file    Years of education: Not on file    Highest education level: Not on file   Occupational History    Not on file   Tobacco Use    Smoking status: Never    Smokeless tobacco: Never   Vaping Use    Vaping Use: Never used   Substance and Sexual Activity    Alcohol use: Yes     Alcohol/week: 0.6 oz     Types: 1 Standard drinks or equivalent per week     Comment: 1per week    Drug use: Yes     Types: Marijuana, Oral     Comment: 4 per year    Sexual activity: Yes     Partners: Male     Comment: vasectomy   Other Topics Concern     Service No    Blood Transfusions No    Caffeine Concern No    Occupational Exposure No    Hobby Hazards Yes    Sleep Concern Yes    Stress Concern No    Weight Concern No    Special Diet No    Back Care No    Exercise Yes    Bike Helmet Yes    Seat Belt Yes    Self-Exams No   Social History Narrative    Not on file     Social Determinants of Health     Financial Resource Strain: Not on file   Food Insecurity: Not on file   Transportation Needs: Not on file   Physical Activity: Not on file   Stress: Not on file   Social Connections: Not on file   Intimate Partner Violence: Not on file   Housing Stability: Not on file      Social History     Tobacco Use   Smoking Status Never   Smokeless Tobacco Never     Social History     Substance and Sexual Activity   Alcohol Use Yes    Alcohol/week: 0.6 oz    Types: 1 Standard drinks or equivalent per week    Comment: 1per week     Social History     Substance and Sexual Activity    Drug Use Yes    Types: Marijuana, Oral    Comment: 4 per year      Family History   Problem Relation Age of Onset    Hypertension Mother     Hyperlipidemia Mother     Heart Disease Mother 77        MI    Sleep Apnea Mother     Hypertension Father     Hyperlipidemia Father     Other Father         Alzheimers    Sleep Apnea Father     Diabetes Other           Objective:   There were no vitals taken for this visit.    Physical Exam  Constitutional:       Appearance: Normal appearance.   HENT:      Head: Normocephalic and atraumatic.   Eyes:      General: No scleral icterus.     Pupils: Pupils are equal, round, and reactive to light.   Pulmonary:      Effort: Pulmonary effort is normal.   Neurological:      Mental Status: She is alert.         Labs  Lab Results   Component Value Date/Time    WBC 7.8 04/07/2023 04:54 AM    WBC 10.8 12/30/2022 03:30 AM    RBC 5.09 04/07/2023 04:54 AM    RBC 4.72 12/30/2022 03:30 AM    HEMOGLOBIN 14.8 04/07/2023 04:54 AM    HEMOGLOBIN 13.8 12/30/2022 03:30 AM    HEMATOCRIT 45.2 04/07/2023 04:54 AM    HEMATOCRIT 40.8 12/30/2022 03:30 AM    MCV 89 04/07/2023 04:54 AM    MCV 86.4 12/30/2022 03:30 AM    MCH 29.1 04/07/2023 04:54 AM    MCH 29.2 12/30/2022 03:30 AM    MCHC 32.7 04/07/2023 04:54 AM    MCHC 33.8 12/30/2022 03:30 AM    MPV 8.9 (L) 12/30/2022 03:30 AM    NEUTSPOLYS 45 04/07/2023 04:54 AM    NEUTSPOLYS 48.30 12/30/2022 03:30 AM    LYMPHOCYTES 48 04/07/2023 04:54 AM    LYMPHOCYTES 44.20 (H) 12/30/2022 03:30 AM    MONOCYTES 6 04/07/2023 04:54 AM    MONOCYTES 5.40 12/30/2022 03:30 AM    EOSINOPHILS 1 04/07/2023 04:54 AM    EOSINOPHILS 1.40 12/30/2022 03:30 AM    BASOPHILS 0 04/07/2023 04:54 AM    BASOPHILS 0.30 12/30/2022 03:30 AM      Lab Results   Component Value Date/Time    SODIUM 140 04/07/2023 04:55 AM    SODIUM 137 12/30/2022 03:30 AM    POTASSIUM 4.3 04/07/2023 04:55 AM    POTASSIUM 3.9 12/30/2022 03:30 AM    CHLORIDE 105 04/07/2023 04:55 AM    CHLORIDE 103 12/30/2022 03:30 AM     CO2 23 04/07/2023 04:55 AM    CO2 21 12/30/2022 03:30 AM    GLUCOSE 94 04/07/2023 04:55 AM    GLUCOSE 103 (H) 12/30/2022 03:30 AM    BUN 7 04/07/2023 04:55 AM    BUN 6 (L) 12/30/2022 03:30 AM    CREATININE 0.80 04/07/2023 04:55 AM    CREATININE 0.57 12/30/2022 03:30 AM    BUNCREATRAT 9 04/07/2023 04:55 AM           Imaging  Right Upper Quadrant US 12/30/2022  IMPRESSION:  1.  Multiple gallbladder polyps measuring up to 4 mm in size.  2.  No gallstones or biliary ductal dilatation.  3.  The liver is echogenic consistent with fatty change versus hepatocellular dysfunction.    Pathology  None    Procedures  None      Diagnosis:     1. Gallbladder polyp        2. Biliary colic        3. Abdominal pain, unspecified abdominal location            Medical Decision Making:  Today's Assessment / Status / Plan:     The patient has an ultrasound with question gallbladder polyps.  I believe this is likely cholesterolosis.  Since I saw her last she has had no episodes of pain.    I think at this point it would be reasonable to reimage her in 6 months with an ultrasound.  If the patient remains asymptomatic, then we could either continue to follow this or see how she does over time.    I did offer the patient cholecystectomy, but I do think that watchful waiting is very reasonable in this situation.  The patient does know that should she develop pain again to please call and we will schedule her for cholecystectomy.    Greater than 30 minutes were spent with the patient.  This included review of outside records and imaging, counseling of the patient and coordination of care.  \

## 2023-05-16 ENCOUNTER — OFFICE VISIT (OUTPATIENT)
Dept: SURGICAL ONCOLOGY | Facility: MEDICAL CENTER | Age: 48
End: 2023-05-16
Payer: COMMERCIAL

## 2023-05-16 VITALS
SYSTOLIC BLOOD PRESSURE: 122 MMHG | OXYGEN SATURATION: 100 % | WEIGHT: 186.51 LBS | TEMPERATURE: 97.4 F | DIASTOLIC BLOOD PRESSURE: 88 MMHG | BODY MASS INDEX: 26.76 KG/M2 | HEART RATE: 74 BPM

## 2023-05-16 DIAGNOSIS — K82.4 GALLBLADDER POLYP: ICD-10-CM

## 2023-05-16 DIAGNOSIS — K80.50 BILIARY COLIC: ICD-10-CM

## 2023-05-16 DIAGNOSIS — R10.9 ABDOMINAL PAIN, UNSPECIFIED ABDOMINAL LOCATION: ICD-10-CM

## 2023-05-16 PROCEDURE — 3079F DIAST BP 80-89 MM HG: CPT | Performed by: SURGERY

## 2023-05-16 PROCEDURE — 1158F ADVNC CARE PLAN TLK DOCD: CPT | Performed by: SURGERY

## 2023-05-16 PROCEDURE — 99214 OFFICE O/P EST MOD 30 MIN: CPT | Performed by: SURGERY

## 2023-05-16 PROCEDURE — 3074F SYST BP LT 130 MM HG: CPT | Performed by: SURGERY

## 2023-05-16 ASSESSMENT — FIBROSIS 4 INDEX: FIB4 SCORE: 0.61

## 2023-06-27 ENCOUNTER — HOSPITAL ENCOUNTER (OUTPATIENT)
Dept: LAB | Facility: MEDICAL CENTER | Age: 48
End: 2023-06-27
Attending: PHYSICIAN ASSISTANT
Payer: COMMERCIAL

## 2023-06-27 PROCEDURE — 87624 HPV HI-RISK TYP POOLED RSLT: CPT

## 2023-06-27 PROCEDURE — 88175 CYTOPATH C/V AUTO FLUID REDO: CPT

## 2023-08-18 ENCOUNTER — APPOINTMENT (OUTPATIENT)
Dept: MEDICAL GROUP | Facility: MEDICAL CENTER | Age: 48
End: 2023-08-18
Payer: COMMERCIAL

## 2023-08-18 SDOH — HEALTH STABILITY: PHYSICAL HEALTH: ON AVERAGE, HOW MANY DAYS PER WEEK DO YOU ENGAGE IN MODERATE TO STRENUOUS EXERCISE (LIKE A BRISK WALK)?: 3 DAYS

## 2023-08-18 SDOH — ECONOMIC STABILITY: INCOME INSECURITY: IN THE LAST 12 MONTHS, WAS THERE A TIME WHEN YOU WERE NOT ABLE TO PAY THE MORTGAGE OR RENT ON TIME?: NO

## 2023-08-18 SDOH — ECONOMIC STABILITY: HOUSING INSECURITY
IN THE LAST 12 MONTHS, WAS THERE A TIME WHEN YOU DID NOT HAVE A STEADY PLACE TO SLEEP OR SLEPT IN A SHELTER (INCLUDING NOW)?: NO

## 2023-08-18 SDOH — ECONOMIC STABILITY: TRANSPORTATION INSECURITY
IN THE PAST 12 MONTHS, HAS THE LACK OF TRANSPORTATION KEPT YOU FROM MEDICAL APPOINTMENTS OR FROM GETTING MEDICATIONS?: NO

## 2023-08-18 SDOH — ECONOMIC STABILITY: FOOD INSECURITY: WITHIN THE PAST 12 MONTHS, THE FOOD YOU BOUGHT JUST DIDN'T LAST AND YOU DIDN'T HAVE MONEY TO GET MORE.: NEVER TRUE

## 2023-08-18 SDOH — ECONOMIC STABILITY: HOUSING INSECURITY: IN THE LAST 12 MONTHS, HOW MANY PLACES HAVE YOU LIVED?: 1

## 2023-08-18 SDOH — ECONOMIC STABILITY: FOOD INSECURITY: WITHIN THE PAST 12 MONTHS, YOU WORRIED THAT YOUR FOOD WOULD RUN OUT BEFORE YOU GOT MONEY TO BUY MORE.: NEVER TRUE

## 2023-08-18 SDOH — ECONOMIC STABILITY: TRANSPORTATION INSECURITY
IN THE PAST 12 MONTHS, HAS LACK OF TRANSPORTATION KEPT YOU FROM MEETINGS, WORK, OR FROM GETTING THINGS NEEDED FOR DAILY LIVING?: NO

## 2023-08-18 SDOH — HEALTH STABILITY: PHYSICAL HEALTH: ON AVERAGE, HOW MANY MINUTES DO YOU ENGAGE IN EXERCISE AT THIS LEVEL?: 20 MIN

## 2023-08-18 SDOH — HEALTH STABILITY: MENTAL HEALTH
STRESS IS WHEN SOMEONE FEELS TENSE, NERVOUS, ANXIOUS, OR CAN'T SLEEP AT NIGHT BECAUSE THEIR MIND IS TROUBLED. HOW STRESSED ARE YOU?: TO SOME EXTENT

## 2023-08-18 SDOH — ECONOMIC STABILITY: TRANSPORTATION INSECURITY
IN THE PAST 12 MONTHS, HAS LACK OF RELIABLE TRANSPORTATION KEPT YOU FROM MEDICAL APPOINTMENTS, MEETINGS, WORK OR FROM GETTING THINGS NEEDED FOR DAILY LIVING?: NO

## 2023-08-18 SDOH — ECONOMIC STABILITY: INCOME INSECURITY: HOW HARD IS IT FOR YOU TO PAY FOR THE VERY BASICS LIKE FOOD, HOUSING, MEDICAL CARE, AND HEATING?: NOT HARD AT ALL

## 2023-08-18 ASSESSMENT — SOCIAL DETERMINANTS OF HEALTH (SDOH)
HOW OFTEN DO YOU ATTEND CHURCH OR RELIGIOUS SERVICES?: NEVER
IN A TYPICAL WEEK, HOW MANY TIMES DO YOU TALK ON THE PHONE WITH FAMILY, FRIENDS, OR NEIGHBORS?: TWICE A WEEK
IN A TYPICAL WEEK, HOW MANY TIMES DO YOU TALK ON THE PHONE WITH FAMILY, FRIENDS, OR NEIGHBORS?: TWICE A WEEK
HOW OFTEN DO YOU GET TOGETHER WITH FRIENDS OR RELATIVES?: ONCE A WEEK
HOW OFTEN DO YOU GET TOGETHER WITH FRIENDS OR RELATIVES?: ONCE A WEEK
DO YOU BELONG TO ANY CLUBS OR ORGANIZATIONS SUCH AS CHURCH GROUPS UNIONS, FRATERNAL OR ATHLETIC GROUPS, OR SCHOOL GROUPS?: NO
IN A TYPICAL WEEK, HOW MANY TIMES DO YOU TALK ON THE PHONE WITH FAMILY, FRIENDS, OR NEIGHBORS?: TWICE A WEEK
HOW OFTEN DO YOU ATTENT MEETINGS OF THE CLUB OR ORGANIZATION YOU BELONG TO?: NEVER
DO YOU BELONG TO ANY CLUBS OR ORGANIZATIONS SUCH AS CHURCH GROUPS UNIONS, FRATERNAL OR ATHLETIC GROUPS, OR SCHOOL GROUPS?: NO
WITHIN THE PAST 12 MONTHS, YOU WORRIED THAT YOUR FOOD WOULD RUN OUT BEFORE YOU GOT THE MONEY TO BUY MORE: NEVER TRUE
HOW OFTEN DO YOU HAVE SIX OR MORE DRINKS ON ONE OCCASION: NEVER
HOW OFTEN DO YOU HAVE A DRINK CONTAINING ALCOHOL: MONTHLY OR LESS
HOW HARD IS IT FOR YOU TO PAY FOR THE VERY BASICS LIKE FOOD, HOUSING, MEDICAL CARE, AND HEATING?: NOT HARD AT ALL
HOW HARD IS IT FOR YOU TO PAY FOR THE VERY BASICS LIKE FOOD, HOUSING, MEDICAL CARE, AND HEATING?: NOT HARD AT ALL
HOW OFTEN DO YOU GET TOGETHER WITH FRIENDS OR RELATIVES?: ONCE A WEEK
HOW OFTEN DO YOU ATTEND CHURCH OR RELIGIOUS SERVICES?: NEVER
HOW OFTEN DO YOU ATTEND CHURCH OR RELIGIOUS SERVICES?: NEVER
HOW OFTEN DO YOU ATTENT MEETINGS OF THE CLUB OR ORGANIZATION YOU BELONG TO?: NEVER
WITHIN THE PAST 12 MONTHS, YOU WORRIED THAT YOUR FOOD WOULD RUN OUT BEFORE YOU GOT THE MONEY TO BUY MORE: NEVER TRUE
DO YOU BELONG TO ANY CLUBS OR ORGANIZATIONS SUCH AS CHURCH GROUPS UNIONS, FRATERNAL OR ATHLETIC GROUPS, OR SCHOOL GROUPS?: NO
HOW OFTEN DO YOU ATTENT MEETINGS OF THE CLUB OR ORGANIZATION YOU BELONG TO?: NEVER
HOW OFTEN DO YOU ATTEND CHURCH OR RELIGIOUS SERVICES?: NEVER
HOW OFTEN DO YOU HAVE SIX OR MORE DRINKS ON ONE OCCASION: NEVER
HOW OFTEN DO YOU HAVE A DRINK CONTAINING ALCOHOL: MONTHLY OR LESS
HOW OFTEN DO YOU ATTENT MEETINGS OF THE CLUB OR ORGANIZATION YOU BELONG TO?: NEVER
IN A TYPICAL WEEK, HOW MANY TIMES DO YOU TALK ON THE PHONE WITH FAMILY, FRIENDS, OR NEIGHBORS?: TWICE A WEEK
DO YOU BELONG TO ANY CLUBS OR ORGANIZATIONS SUCH AS CHURCH GROUPS UNIONS, FRATERNAL OR ATHLETIC GROUPS, OR SCHOOL GROUPS?: NO
HOW OFTEN DO YOU GET TOGETHER WITH FRIENDS OR RELATIVES?: ONCE A WEEK
HOW MANY DRINKS CONTAINING ALCOHOL DO YOU HAVE ON A TYPICAL DAY WHEN YOU ARE DRINKING: 1 OR 2
HOW MANY DRINKS CONTAINING ALCOHOL DO YOU HAVE ON A TYPICAL DAY WHEN YOU ARE DRINKING: 1 OR 2

## 2023-08-18 ASSESSMENT — LIFESTYLE VARIABLES
SKIP TO QUESTIONS 9-10: 1
HOW MANY STANDARD DRINKS CONTAINING ALCOHOL DO YOU HAVE ON A TYPICAL DAY: 1 OR 2
AUDIT-C TOTAL SCORE: 1
HOW OFTEN DO YOU HAVE SIX OR MORE DRINKS ON ONE OCCASION: NEVER
HOW OFTEN DO YOU HAVE A DRINK CONTAINING ALCOHOL: MONTHLY OR LESS
HOW OFTEN DO YOU HAVE A DRINK CONTAINING ALCOHOL: MONTHLY OR LESS
AUDIT-C TOTAL SCORE: 1
HOW OFTEN DO YOU HAVE SIX OR MORE DRINKS ON ONE OCCASION: NEVER
SKIP TO QUESTIONS 9-10: 1
HOW MANY STANDARD DRINKS CONTAINING ALCOHOL DO YOU HAVE ON A TYPICAL DAY: 1 OR 2

## 2023-09-01 ENCOUNTER — APPOINTMENT (OUTPATIENT)
Dept: MEDICAL GROUP | Facility: MEDICAL CENTER | Age: 48
End: 2023-09-01
Payer: COMMERCIAL

## 2023-09-25 NOTE — PROGRESS NOTES
Subjective:      Primary care physician: Korin Garcia D.O.    Chief Complaint: No chief complaint on file.    Diagnosis:   1. Gallbladder polyp        2. Biliary colic        3. Abdominal pain, unspecified abdominal location        4. Irritable bowel syndrome, unspecified type          History of presenting illness:    Janette Swan is a pleasant 47 y.o. female presented to the emergency room with epigastric pain radiating to her back.  Work-up included an ultrasound which showed question of multiple small polyps in her gallbladder without gallbladder wall thickening.  Liver function tests were normal.  Common duct diameter was normal as well.  The patient does not think she has had pain like this in the past but she does have irritable bowel syndrome.  The pain lasted about 3 hours and then dissipated.  She has been eating a low-fat diet and has been doing well without trouble.     Update 5/16/2023  The patient is doing well.  Since I saw her last she has had no pain at all. She was able to reintroduce fatty foods back into her diet without trouble.    Update 11/8/23    She is here today for ***    Past Medical History:   Diagnosis Date    Advance directive in chart 08/15/2002    Arrhythmia 09/2022    PVC's    Back pain 09/29/2022    down left leg    Frequent headaches     History of intestinal parasite 1994    after [place] trip    History of pneumonia 12/2011    IBS (irritable bowel syndrome)     Morning headache     Nephrolithiasis 04/24/2014     Past Surgical History:   Procedure Laterality Date    TONSILLECTOMY  1995    TURBINECTOMY       Allergies   Allergen Reactions    Codeine Vomiting     Outpatient Encounter Medications as of 11/8/2023   Medication Sig Dispense Refill    TURMERIC PO Take  by mouth every 7 days.      ondansetron (ZOFRAN ODT) 4 MG TABLET DISPERSIBLE Take 1 Tablet by mouth every 8 hours as needed for Nausea/Vomiting. (Patient not taking: Reported on 4/5/2023) 10 Tablet 0    vitamin D3  (CHOLECALCIFEROL) 1000 Unit (25 mcg) Tab Take 1,000 Units by mouth every day.      Multiple Vitamin (MULTIVITAMIN ADULT PO) Take 1 Tablet by mouth every day.      Omega-3 Fatty Acids (FISH OIL) 1000 MG Cap capsule Take 1,000 mg by mouth every day.      metoprolol tartrate (LOPRESSOR) 25 MG Tab Take 0.5 Tablets by mouth 2 times a day. 90 Tablet 0    MAGNESIUM PO Take  by mouth.       No facility-administered encounter medications on file as of 11/8/2023.     Social History     Socioeconomic History    Marital status:      Spouse name: Not on file    Number of children: Not on file    Years of education: Not on file    Highest education level: Some college, no degree   Occupational History    Not on file   Tobacco Use    Smoking status: Never    Smokeless tobacco: Never   Vaping Use    Vaping Use: Never used   Substance and Sexual Activity    Alcohol use: Yes     Alcohol/week: 0.6 oz     Types: 1 Standard drinks or equivalent per week     Comment: 1per week    Drug use: Yes     Types: Marijuana, Oral     Comment: 4 per year    Sexual activity: Yes     Partners: Male     Comment: vasectomy   Other Topics Concern     Service No    Blood Transfusions No    Caffeine Concern No    Occupational Exposure No    Hobby Hazards Yes    Sleep Concern Yes    Stress Concern No    Weight Concern No    Special Diet No    Back Care No    Exercise Yes    Bike Helmet Yes    Seat Belt Yes    Self-Exams No   Social History Narrative    Not on file     Social Determinants of Health     Financial Resource Strain: Low Risk  (8/18/2023)    Overall Financial Resource Strain (CARDIA)     Difficulty of Paying Living Expenses: Not hard at all   Food Insecurity: No Food Insecurity (8/18/2023)    Hunger Vital Sign     Worried About Running Out of Food in the Last Year: Never true     Ran Out of Food in the Last Year: Never true   Transportation Needs: No Transportation Needs (8/18/2023)    PRAPARE - Transportation     Lack of  Transportation (Medical): No     Lack of Transportation (Non-Medical): No   Physical Activity: Insufficiently Active (8/18/2023)    Exercise Vital Sign     Days of Exercise per Week: 3 days     Minutes of Exercise per Session: 20 min   Stress: Stress Concern Present (8/18/2023)    Citizen of [place] [employer] - Occupational Stress Questionnaire     Feeling of Stress : To some extent   Social Connections: Moderately Isolated (8/18/2023)    Social Connection and Isolation Panel [NHANES]     Frequency of Communication with Friends and Family: Twice a week     Frequency of Social Gatherings with Friends and Family: Once a week     Attends Anabaptist Services: Never     Active Member of Clubs or Organizations: No     Attends Club or Organization Meetings: Never     Marital Status:    Intimate Partner Violence: Not on file   Housing Stability: Low Risk  (8/18/2023)    Housing Stability Vital Sign     Unable to Pay for Housing in the Last Year: No     Number of Places Lived in the Last Year: 1     Unstable Housing in the Last Year: No      Social History     Tobacco Use   Smoking Status Never   Smokeless Tobacco Never     Social History     Substance and Sexual Activity   Alcohol Use Yes    Alcohol/week: 0.6 oz    Types: 1 Standard drinks or equivalent per week    Comment: 1per week     Social History     Substance and Sexual Activity   Drug Use Yes    Types: Marijuana, Oral    Comment: 4 per year      Family History   Problem Relation Age of Onset    Hypertension Mother     Hyperlipidemia Mother     Heart Disease Mother 77        MI    Sleep Apnea Mother     Hypertension Father     Hyperlipidemia Father     Other Father         Alzheimers    Sleep Apnea Father     Diabetes Other      ROS     Objective:   There were no vitals taken for this visit.    Physical Exam    Labs   Latest Reference Range & Units 04/07/23 04:54   WBC 3.4 - 10.8 x10E3/uL 7.8   RBC 3.77 - 5.28 x10E6/uL 5.09   Hemoglobin 11.1 - 15.9  g/dL 14.8   Hematocrit 34.0 - 46.6 % 45.2   MCV 79 - 97 fL 89   MCH 26.6 - 33.0 pg 29.1   MCHC 31.5 - 35.7 g/dL 32.7   RDW 11.7 - 15.4 % 12.7   Platelet Count 150 - 450 x10E3/uL 342         [facility] Reference Range & Units 04/07/23 04:55   Sodium 134 - 144 mmol/L 140   Potassium 3.5 - 5.2 mmol/L 4.3   Chloride 96 - 106 mmol/L 105   Co2 20 - 29 mmol/L 23   Glucose 70 - 99 mg/dL 94   Bun 6 - 24 mg/dL 7   Creatinine 0.57 - 1.00 mg/dL 0.80   Bun-Creatinine Ratio 9 - 23  9   Calcium 8.7 - 10.2 mg/dL 9.7   AST(SGOT) 0 - 40 IU/L 16   ALT(SGPT) 0 - 32 IU/L 14   Alkaline Phosphatase 44 - 121 IU/L 43 (L)   Total Bilirubin 0.0 - 1.2 mg/dL 0.7   Albumin 3.8 - 4.8 g/dL 4.6   Total Protein 6.0 - 8.5 g/dL 6.8   Globulin 1.5 - 4.5 g/dL 2.2   A-G Ratio 1.2 - 2.2  2.1   (L): Data is abnormally low    Imaging  Right Upper Quadrant US 12/30/2022  IMPRESSION:  1.  Multiple gallbladder polyps measuring up to 4 mm in size.  2.  No gallstones or biliary ductal dilatation.  3.  The liver is echogenic consistent with fatty change versus hepatocellular dysfunction.     Pathology  None     Procedures  None       Diagnosis:     1. Gallbladder polyp        2. Biliary colic        3. Abdominal pain, unspecified abdominal location        4. Irritable bowel syndrome, unspecified type            Medical Decision Making:  Today's Assessment / Status / Plan:     ***    I, Dr Graves, have entered, reviewed and confirmed the above diagnoses related to this patient on this date of service, as per the time and date noted at top of this note.

## 2023-11-08 ENCOUNTER — APPOINTMENT (OUTPATIENT)
Dept: SURGICAL ONCOLOGY | Facility: MEDICAL CENTER | Age: 48
End: 2023-11-08
Payer: COMMERCIAL

## 2023-11-08 ENCOUNTER — HOSPITAL ENCOUNTER (OUTPATIENT)
Facility: MEDICAL CENTER | Age: 48
End: 2023-11-08
Attending: PHYSICIAN ASSISTANT
Payer: COMMERCIAL

## 2023-11-08 LAB — PATHOLOGY CONSULT NOTE: NORMAL

## 2023-11-08 PROCEDURE — 88305 TISSUE EXAM BY PATHOLOGIST: CPT

## 2023-11-16 ENCOUNTER — HOSPITAL ENCOUNTER (OUTPATIENT)
Dept: RADIOLOGY | Facility: MEDICAL CENTER | Age: 48
End: 2023-11-16
Attending: SURGERY
Payer: COMMERCIAL

## 2023-11-16 DIAGNOSIS — K82.4 GALLBLADDER POLYP: ICD-10-CM

## 2023-11-16 PROCEDURE — 76705 ECHO EXAM OF ABDOMEN: CPT

## 2023-11-20 ENCOUNTER — TELEPHONE (OUTPATIENT)
Dept: SURGICAL ONCOLOGY | Facility: MEDICAL CENTER | Age: 48
End: 2023-11-20
Payer: COMMERCIAL

## 2023-11-20 NOTE — TELEPHONE ENCOUNTER
Spoke to patient regarding scheduling appointment per patient she has to speak with her job and then will call back to schedule.     ----- Message from Keenan Graves M.D. sent at 11/16/2023  1:19 PM PST -----  Can I see her back?

## 2024-02-22 ENCOUNTER — TELEPHONE (OUTPATIENT)
Dept: CARDIOLOGY | Facility: MEDICAL CENTER | Age: 49
End: 2024-02-22
Payer: COMMERCIAL

## 2024-02-22 NOTE — LETTER
PROCEDURE/SURGERY CLEARANCE FORM      Encounter Date: 2/22/2024    Patient: Janette Swan  YOB: 1975    CARDIOLOGIST:  Rajesh Hall M.D.    REFERRING DOCTOR:  No ref. provider found    The above patient is cleared to have the following procedure/surgery: colonoscopy w/ deep sedation     PROCEDURE/SURGERY CLEARANCE FORM    Date: 2/22/2024   Patient Name: Janette Swan    Dear Surgeon or Proceduralist,      Thank you for your request for cardiac stratification of our mutual patient Janette Swan 1975. We have reviewed their [facility] records; and to the best of our understanding this patient has not had stenting, ablation, cardiothoracic surgery or hospitalization for cardiovascular reasons in the past 6 months.  Janette Swan has been seen within the past 18 months and is considered to have non-modifiable cardiac risk for this low-risk procedure/surgery. They may proceed from a cardiovascular standpoint and may hold their antiplatelet/anticoagulation as briefly as possible. Please have patient resume this medication when hemodynamically stable to do so.     Aspirin or Prasugrel   - hold 7 days prior to procedure/surgery, resume when hemodynamically stable      Clopidrogrel or Ticagrelor  - hold 7 days for all neurological procedures, hold 5 days prior to all other procedure/surgery,  resume when hemodynamically stable     Warfarin - hold 7 days for all neurological procedures, hold 5 days prior to all other procedure/surgery and coordinate with [facility] ([phone]) INR testing and dose management.      Pradaxa/Xarelto/Eliquis/Savesya - hold 1 day prior to procedure for low bleeding risk procedure, 2 days for high bleeding risk procedure, or consider holding 3 days or longer for patients with reduced kidney function (CrCl <30mL/min) or spinal/cranial surgeries/procedures.      If they have a mechanical heart valve, please coordinate with [facility]  [facility] ([phone]) the proper management of their anticoagulant in the periprocedural or perioperative period.      Some patients have higher risk for cardiovascular complications or holding medication. If our patient has had prior complications of holding antiplatelet or anticoagulants in the past and we have seen them after these events, we have addressed these concerns with the patient. They are at an unknown degree of increased risk for recurrent complication.  You may hold anticoagulation/antiplatelets for the procedure or surgery if the benefits of the procedure or surgery outweigh this nonmodifiable risk.      If Janette Swan 1975 has new symptoms of heart failure decompensation, unstable arrythmia, or angina please reach out and we will assess the patient.      If you have other patient-specific concerns, please feel free to reach out to the patient's cardiologist directly at [phone].     Thank you,       [facility]      Electronically signed        MD Signature   Rajesh Hall M.D.

## 2024-05-10 ENCOUNTER — HOSPITAL ENCOUNTER (OUTPATIENT)
Dept: RADIOLOGY | Facility: MEDICAL CENTER | Age: 49
End: 2024-05-10
Attending: INTERNAL MEDICINE
Payer: COMMERCIAL

## 2024-05-10 DIAGNOSIS — Z12.39 SCREENING BREAST EXAMINATION: ICD-10-CM

## 2024-05-17 ENCOUNTER — OFFICE VISIT (OUTPATIENT)
Dept: CARDIOLOGY | Facility: MEDICAL CENTER | Age: 49
End: 2024-05-17
Attending: NURSE PRACTITIONER
Payer: COMMERCIAL

## 2024-05-17 ENCOUNTER — TELEPHONE (OUTPATIENT)
Dept: CARDIOLOGY | Facility: MEDICAL CENTER | Age: 49
End: 2024-05-17

## 2024-05-17 VITALS
WEIGHT: 183 LBS | BODY MASS INDEX: 26.2 KG/M2 | HEART RATE: 61 BPM | DIASTOLIC BLOOD PRESSURE: 62 MMHG | OXYGEN SATURATION: 96 % | SYSTOLIC BLOOD PRESSURE: 110 MMHG | HEIGHT: 70 IN | RESPIRATION RATE: 10 BRPM

## 2024-05-17 DIAGNOSIS — I49.3 FREQUENT PVCS: ICD-10-CM

## 2024-05-17 PROCEDURE — 99213 OFFICE O/P EST LOW 20 MIN: CPT | Performed by: NURSE PRACTITIONER

## 2024-05-17 PROCEDURE — 3078F DIAST BP <80 MM HG: CPT | Performed by: NURSE PRACTITIONER

## 2024-05-17 PROCEDURE — 1158F ADVNC CARE PLAN TLK DOCD: CPT | Performed by: NURSE PRACTITIONER

## 2024-05-17 PROCEDURE — 3074F SYST BP LT 130 MM HG: CPT | Performed by: NURSE PRACTITIONER

## 2024-05-17 ASSESSMENT — ENCOUNTER SYMPTOMS
FALLS: 0
WEAKNESS: 0
LOSS OF CONSCIOUSNESS: 0
FOCAL WEAKNESS: 0
NERVOUS/ANXIOUS: 0
COUGH: 0
DOUBLE VISION: 0
PALPITATIONS: 0
DIZZINESS: 0
WHEEZING: 0
SHORTNESS OF BREATH: 0
BLURRED VISION: 0
ORTHOPNEA: 0
HEADACHES: 0

## 2024-05-17 ASSESSMENT — FIBROSIS 4 INDEX: FIB4 SCORE: 0.63

## 2024-05-17 NOTE — PROGRESS NOTES
Chief Complaint   Patient presents with    Palpitations       Subjective     Janette Swan is a 48 y.o. female who presents today for follow up PVCs    She was seen by Dr. Hall for initial consultation for PVCs. Previously seen Dr. Dunn in [place]. History of hyperlipidemia and Family history of CAD and CVA.     Patient is doing well. No cardiovascular complaints. Denies any chest pain, pressure, sob, dizziness or palpitations.     Past Medical History:   Diagnosis Date    Advance directive in chart 08/15/2002    Arrhythmia 09/2022    PVC's    Back pain 09/29/2022    down left leg    Frequent headaches     History of intestinal parasite 1994    after [place] trip    History of pneumonia 12/2011    IBS (irritable bowel syndrome)     Morning headache     Nephrolithiasis 04/24/2014     Past Surgical History:   Procedure Laterality Date    TONSILLECTOMY  1995    TURBINECTOMY       Family History   Problem Relation Age of Onset    Hypertension Mother     Hyperlipidemia Mother     Heart Disease Mother 77        MI    Sleep Apnea Mother     Hypertension Father     Hyperlipidemia Father     Other Father         Alzheimers    Sleep Apnea Father     Diabetes Other      Social History     Socioeconomic History    Marital status:      Spouse name: Not on file    Number of children: Not on file    Years of education: Not on file    Highest education level: Some college, no degree   Occupational History    Not on file   Tobacco Use    Smoking status: Never    Smokeless tobacco: Never   Vaping Use    Vaping status: Never Used   Substance and Sexual Activity    Alcohol use: Yes     Alcohol/week: 0.6 oz     Types: 1 Standard drinks or equivalent per week     Comment: OCC    Drug use: Yes     Types: Marijuana, Oral     Comment: 4 per year    Sexual activity: Yes     Partners: Male     Comment: vasectomy   Other Topics Concern     Service No    Blood Transfusions No    Caffeine Concern No    Occupational  Exposure No    Hobby Hazards Yes    Sleep Concern Yes    Stress Concern No    Weight Concern No    Special Diet No    Back Care No    Exercise Yes    Bike Helmet Yes    Seat Belt Yes    Self-Exams No   Social History Narrative    Not on file     Social Determinants of Health     Financial Resource Strain: Low Risk  (8/18/2023)    Overall Financial Resource Strain (CARDIA)     Difficulty of Paying Living Expenses: Not hard at all   Food Insecurity: No Food Insecurity (8/18/2023)    Hunger Vital Sign     Worried About Running Out of Food in the Last Year: Never true     Ran Out of Food in the Last Year: Never true   Transportation Needs: No Transportation Needs (8/18/2023)    PRAPARE - Transportation     Lack of Transportation (Medical): No     Lack of Transportation (Non-Medical): No   Physical Activity: Insufficiently Active (8/18/2023)    Exercise Vital Sign     Days of Exercise per Week: 3 days     Minutes of Exercise per Session: 20 min   Stress: Stress Concern Present (8/18/2023)    [employer] - Occupational Stress Questionnaire     Feeling of Stress : To some extent   Social Connections: Moderately Isolated (8/18/2023)    Social Connection and Isolation Panel [NHANES]     Frequency of Communication with Friends and Family: Twice a week     Frequency of Social Gatherings with Friends and Family: Once a week     Attends Worship Services: Never     Active Member of Clubs or Organizations: No     Attends Club or Organization Meetings: Never     Marital Status:    Intimate Partner Violence: Not on file   Housing Stability: Low Risk  (8/18/2023)    Housing Stability Vital Sign     Unable to Pay for Housing in the Last Year: No     Number of Places Lived in the Last Year: 1     Unstable Housing in the Last Year: No     Allergies   Allergen Reactions    Codeine Vomiting     Outpatient Encounter Medications as of 5/17/2024   Medication Sig Dispense Refill    metoprolol tartrate  "(LOPRESSOR) 25 MG Tab Take 0.5 Tablets by mouth 2 times a day. 90 Tablet 3    vitamin D3 (CHOLECALCIFEROL) 1000 Unit (25 mcg) Tab Take 2,000 Units by mouth every day.      Multiple Vitamin (MULTIVITAMIN ADULT PO) Take 1 Tablet by mouth every day.      MAGNESIUM PO Take  by mouth.      [DISCONTINUED] metoprolol tartrate (LOPRESSOR) 25 MG Tab Take 0.5 Tablets by mouth 2 times a day. 90 Tablet 3    [DISCONTINUED] TURMERIC PO Take  by mouth every 7 days.      [DISCONTINUED] ondansetron (ZOFRAN ODT) 4 MG TABLET DISPERSIBLE Take 1 Tablet by mouth every 8 hours as needed for Nausea/Vomiting. (Patient not taking: Reported on 4/5/2023) 10 Tablet 0    [DISCONTINUED] Omega-3 Fatty Acids (FISH OIL) 1000 MG Cap capsule Take 1,000 mg by mouth every day. (Patient not taking: Reported on 5/17/2024)      [DISCONTINUED] metoprolol tartrate (LOPRESSOR) 25 MG Tab Take 0.5 Tablets by mouth 2 times a day. 90 Tablet 0     No facility-administered encounter medications on file as of 5/17/2024.     Review of Systems   Constitutional:  Negative for malaise/fatigue.   Eyes:  Negative for blurred vision and double vision.   Respiratory:  Negative for cough, shortness of breath and wheezing.    Cardiovascular:  Negative for chest pain, palpitations, orthopnea and leg swelling.   Musculoskeletal:  Negative for falls.   Neurological:  Negative for dizziness, focal weakness, loss of consciousness, weakness and headaches.   Psychiatric/Behavioral:  The patient is not nervous/anxious.    All other systems reviewed and are negative.             Objective     /62 (BP Location: Left arm, Patient Position: Sitting, BP Cuff Size: Adult)   Pulse 61   Resp (!) 10   Ht 1.778 m (5' 10\")   Wt 83 kg (183 lb)   SpO2 96%   BMI 26.26 kg/m²     Physical Exam  Constitutional:       General: She is not in acute distress.     Appearance: She is well-developed. She is not diaphoretic.   HENT:      Head: Normocephalic and atraumatic.   Eyes:      Pupils: " Pupils are equal, round, and reactive to light.   Neck:      Vascular: No JVD.   Cardiovascular:      Rate and Rhythm: Normal rate and regular rhythm.      Heart sounds: Normal heart sounds.   Pulmonary:      Effort: Pulmonary effort is normal.      Breath sounds: Normal breath sounds.   Abdominal:      General: Bowel sounds are normal. There is no distension.      Palpations: Abdomen is soft.   Musculoskeletal:      Right lower leg: No edema.      Left lower leg: No edema.   Skin:     General: Skin is warm and dry.   Neurological:      Mental Status: She is alert and oriented to person, place, and time.   Psychiatric:         Behavior: Behavior normal.         Thought Content: Thought content normal.         Judgment: Judgment normal.            ECHO  4/18/2023  The left ventricle is normal in size.  The left ventricular ejection fraction is visually estimated to be 65%.  Mild concentric left ventricular hypertrophy.  Normal inferior vena cava size and inspiratory collapse.     Coronary calcification:  4/7/2023  LMA - 0.0  LCX - 0.0  LAD - 0.0  RCA - 0.0  PDA - 0.0     Total Calcium Score: 0.0     Percentile: Calcium score is below the 75th percentile for the patient's age and sex.     Other findings:  Heart: Normal size.  Lungs: Minimal dependent groundglass opacities are likely due to atelectasis.  Mediastinum: Normal.  Upper abdomen: Normal.     IMPRESSION:     Coronary Calcium Score of 0      Lab Results   Component Value Date/Time    CHOLSTRLTOT 229 (H) 04/07/2023 04:55 AM    CHOLSTRLTOT 226 (H) 04/13/2022 08:15 AM     (H) 04/13/2022 08:15 AM    HDL 46 04/07/2023 04:55 AM    HDL 62 04/13/2022 08:15 AM    TRIGLYCERIDE 129 04/07/2023 04:55 AM    TRIGLYCERIDE 111 04/13/2022 08:15 AM       Lab Results   Component Value Date/Time    SODIUM 140 04/07/2023 04:55 AM    SODIUM 137 12/30/2022 03:30 AM    POTASSIUM 4.3 04/07/2023 04:55 AM    POTASSIUM 3.9 12/30/2022 03:30 AM    CHLORIDE 105 04/07/2023 04:55 AM     CHLORIDE 103 12/30/2022 03:30 AM    CO2 23 04/07/2023 04:55 AM    CO2 21 12/30/2022 03:30 AM    GLUCOSE 94 04/07/2023 04:55 AM    GLUCOSE 103 (H) 12/30/2022 03:30 AM    BUN 7 04/07/2023 04:55 AM    BUN 6 (L) 12/30/2022 03:30 AM    CREATININE 0.80 04/07/2023 04:55 AM    CREATININE 0.57 12/30/2022 03:30 AM    BUNCREATRAT 9 04/07/2023 04:55 AM     Lab Results   Component Value Date/Time    ALKPHOSPHAT 43 (L) 04/07/2023 04:55 AM    ALKPHOSPHAT 40 12/30/2022 03:30 AM    ASTSGOT 16 04/07/2023 04:55 AM    ASTSGOT 20 12/30/2022 03:30 AM    ALTSGPT 14 04/07/2023 04:55 AM    ALTSGPT 15 12/30/2022 03:30 AM    TBILIRUBIN 0.7 04/07/2023 04:55 AM    TBILIRUBIN <0.2 12/30/2022 03:30 AM           Assessment & Plan     1. Frequent PVCs  metoprolol tartrate (LOPRESSOR) 25 MG Tab    DISCONTINUED: metoprolol tartrate (LOPRESSOR) 25 MG Tab          Medical Decision Making: Today's Assessment/Status/Plan:        Frequent PVCs  - stable   - continue metoprolol 12.5mg BID   - ECHO showed ef 65%    Pending labs from Dr. [facility], we will request     Follow up in 1 year, sooner as needed.

## 2024-05-20 ENCOUNTER — APPOINTMENT (RX ONLY)
Dept: URBAN - METROPOLITAN AREA CLINIC 4 | Facility: CLINIC | Age: 49
Setting detail: DERMATOLOGY
End: 2024-05-20

## 2024-05-20 DIAGNOSIS — D22 MELANOCYTIC NEVI: ICD-10-CM

## 2024-05-20 DIAGNOSIS — D18.0 HEMANGIOMA: ICD-10-CM

## 2024-05-20 DIAGNOSIS — L81.4 OTHER MELANIN HYPERPIGMENTATION: ICD-10-CM

## 2024-05-20 DIAGNOSIS — L82.1 OTHER SEBORRHEIC KERATOSIS: ICD-10-CM

## 2024-05-20 DIAGNOSIS — Z71.89 OTHER SPECIFIED COUNSELING: ICD-10-CM

## 2024-05-20 DIAGNOSIS — L57.0 ACTINIC KERATOSIS: ICD-10-CM

## 2024-05-20 PROBLEM — D18.01 HEMANGIOMA OF SKIN AND SUBCUTANEOUS TISSUE: Status: ACTIVE | Noted: 2024-05-20

## 2024-05-20 PROBLEM — D22.5 MELANOCYTIC NEVI OF TRUNK: Status: ACTIVE | Noted: 2024-05-20

## 2024-05-20 PROCEDURE — 17003 DESTRUCT PREMALG LES 2-14: CPT

## 2024-05-20 PROCEDURE — 17000 DESTRUCT PREMALG LESION: CPT

## 2024-05-20 PROCEDURE — 99213 OFFICE O/P EST LOW 20 MIN: CPT | Mod: 25

## 2024-05-20 PROCEDURE — ? LIQUID NITROGEN

## 2024-05-20 PROCEDURE — ? COUNSELING

## 2024-05-20 ASSESSMENT — LOCATION ZONE DERM
LOCATION ZONE: TRUNK
LOCATION ZONE: LEG

## 2024-05-20 ASSESSMENT — LOCATION SIMPLE DESCRIPTION DERM
LOCATION SIMPLE: RIGHT UPPER BACK
LOCATION SIMPLE: LEFT THIGH
LOCATION SIMPLE: CHEST
LOCATION SIMPLE: RIGHT THIGH

## 2024-05-20 ASSESSMENT — LOCATION DETAILED DESCRIPTION DERM
LOCATION DETAILED: LEFT MEDIAL SUPERIOR CHEST
LOCATION DETAILED: LEFT ANTERIOR PROXIMAL THIGH
LOCATION DETAILED: RIGHT ANTERIOR PROXIMAL THIGH
LOCATION DETAILED: RIGHT SUPERIOR MEDIAL UPPER BACK
LOCATION DETAILED: RIGHT MEDIAL UPPER BACK
LOCATION DETAILED: UPPER STERNUM

## 2024-05-20 NOTE — PROCEDURE: LIQUID NITROGEN
Detail Level: Detailed
Number Of Freeze-Thaw Cycles: 2 freeze-thaw cycles
Show Applicator Variable?: Yes
Render Note In Bullet Format When Appropriate: No
Post-Care Instructions: I reviewed with the patient in detail post-care instructions. Patient is to wear sunprotection, and avoid picking at any of the treated lesions. Pt may apply Vaseline to crusted or scabbing areas.
Duration Of Freeze Thaw-Cycle (Seconds): 2
Consent: The patient's consent was obtained including but not limited to risks of crusting, scabbing, blistering, scarring, darker or lighter pigmentary change, recurrence, incomplete removal and infection.

## 2024-05-30 ENCOUNTER — HOSPITAL ENCOUNTER (OUTPATIENT)
Facility: MEDICAL CENTER | Age: 49
End: 2024-05-30
Attending: PHYSICIAN ASSISTANT
Payer: COMMERCIAL

## 2024-06-06 LAB
CYTOLOGIST CVX/VAG CYTO: NORMAL
CYTOLOGY CVX/VAG DOC CYTO: NORMAL
CYTOLOGY CVX/VAG DOC THIN PREP: NORMAL
HPV I/H RISK 4 DNA CVX QL PROBE+SIG AMP: NEGATIVE
NOTE NL11727A: NORMAL
OTHER STN SPEC: NORMAL
QC REVIEWED BY NL11722A: NORMAL
STAT OF ADQ CVX/VAG CYTO-IMP: NORMAL

## 2024-11-12 ENCOUNTER — HOSPITAL ENCOUNTER (OUTPATIENT)
Facility: MEDICAL CENTER | Age: 49
End: 2024-11-12
Attending: PHYSICIAN ASSISTANT
Payer: COMMERCIAL

## 2024-11-12 PROCEDURE — 88142 CYTOPATH C/V THIN LAYER: CPT

## 2024-11-12 PROCEDURE — 87624 HPV HI-RISK TYP POOLED RSLT: CPT

## 2024-11-21 LAB
HPV I/H RISK 1 DNA SPEC QL NAA+PROBE: NOT DETECTED
SPECIMEN SOURCE: NORMAL
THINPREP PAP, CYTOLOGY NL11781: NORMAL

## 2025-05-13 ENCOUNTER — APPOINTMENT (OUTPATIENT)
Dept: URBAN - METROPOLITAN AREA CLINIC 4 | Facility: CLINIC | Age: 50
Setting detail: DERMATOLOGY
End: 2025-05-13

## 2025-05-13 DIAGNOSIS — D18.0 HEMANGIOMA: ICD-10-CM

## 2025-05-13 DIAGNOSIS — D22 MELANOCYTIC NEVI: ICD-10-CM

## 2025-05-13 DIAGNOSIS — L81.4 OTHER MELANIN HYPERPIGMENTATION: ICD-10-CM

## 2025-05-13 DIAGNOSIS — L72.8 OTHER FOLLICULAR CYSTS OF THE SKIN AND SUBCUTANEOUS TISSUE: ICD-10-CM

## 2025-05-13 DIAGNOSIS — L82.1 OTHER SEBORRHEIC KERATOSIS: ICD-10-CM

## 2025-05-13 DIAGNOSIS — B07.8 OTHER VIRAL WARTS: ICD-10-CM

## 2025-05-13 PROBLEM — D48.5 NEOPLASM OF UNCERTAIN BEHAVIOR OF SKIN: Status: ACTIVE | Noted: 2025-05-13

## 2025-05-13 PROBLEM — D22.5 MELANOCYTIC NEVI OF TRUNK: Status: ACTIVE | Noted: 2025-05-13

## 2025-05-13 PROBLEM — D18.01 HEMANGIOMA OF SKIN AND SUBCUTANEOUS TISSUE: Status: ACTIVE | Noted: 2025-05-13

## 2025-05-13 PROBLEM — D23.72 OTHER BENIGN NEOPLASM OF SKIN OF LEFT LOWER LIMB, INCLUDING HIP: Status: ACTIVE | Noted: 2025-05-13

## 2025-05-13 PROCEDURE — 11103 TANGNTL BX SKIN EA SEP/ADDL: CPT

## 2025-05-13 PROCEDURE — ? PHOTO-DOCUMENTATION

## 2025-05-13 PROCEDURE — ? BIOPSY BY SHAVE METHOD

## 2025-05-13 PROCEDURE — ? DEFER

## 2025-05-13 PROCEDURE — ? COUNSELING

## 2025-05-13 PROCEDURE — 11102 TANGNTL BX SKIN SINGLE LES: CPT

## 2025-05-13 PROCEDURE — 99213 OFFICE O/P EST LOW 20 MIN: CPT | Mod: 25

## 2025-05-13 ASSESSMENT — LOCATION ZONE DERM
LOCATION ZONE: TRUNK
LOCATION ZONE: ARM
LOCATION ZONE: EAR
LOCATION ZONE: SCALP
LOCATION ZONE: EAR
LOCATION ZONE: FEET

## 2025-05-13 ASSESSMENT — LOCATION SIMPLE DESCRIPTION DERM
LOCATION SIMPLE: RIGHT EAR
LOCATION SIMPLE: POSTERIOR SCALP
LOCATION SIMPLE: RIGHT UPPER BACK
LOCATION SIMPLE: RIGHT PLANTAR SURFACE
LOCATION SIMPLE: CHEST
LOCATION SIMPLE: RIGHT FOREARM
LOCATION SIMPLE: RIGHT EAR

## 2025-05-13 ASSESSMENT — LOCATION DETAILED DESCRIPTION DERM
LOCATION DETAILED: RIGHT MEDIAL PLANTAR HEEL
LOCATION DETAILED: RIGHT MEDIAL UPPER BACK
LOCATION DETAILED: RIGHT PROXIMAL RADIAL DORSAL FOREARM
LOCATION DETAILED: RIGHT TRAGUS
LOCATION DETAILED: RIGHT POSTERIOR EAR
LOCATION DETAILED: RIGHT TRAGUS
LOCATION DETAILED: RIGHT INFERIOR POSTAURICULAR SKIN
LOCATION DETAILED: LEFT MEDIAL SUPERIOR CHEST

## 2025-05-13 NOTE — PROCEDURE: DEFER
Size Of Lesion In Cm (Optional): 0
Procedure To Be Performed At Next Visit: Excision
X Size Of Lesion In Cm (Optional): 1
Detail Level: Detailed
Introduction Text (Please End With A Colon): The following procedure was deferred: